# Patient Record
Sex: FEMALE | Race: WHITE | NOT HISPANIC OR LATINO | Employment: OTHER | URBAN - METROPOLITAN AREA
[De-identification: names, ages, dates, MRNs, and addresses within clinical notes are randomized per-mention and may not be internally consistent; named-entity substitution may affect disease eponyms.]

---

## 2017-07-11 ENCOUNTER — OFFICE VISIT (OUTPATIENT)
Dept: INTERNAL MEDICINE | Facility: CLINIC | Age: 57
End: 2017-07-11
Payer: COMMERCIAL

## 2017-07-11 VITALS
SYSTOLIC BLOOD PRESSURE: 124 MMHG | HEART RATE: 80 BPM | HEIGHT: 69 IN | BODY MASS INDEX: 27.7 KG/M2 | TEMPERATURE: 98 F | WEIGHT: 187 LBS | DIASTOLIC BLOOD PRESSURE: 72 MMHG

## 2017-07-11 DIAGNOSIS — S76.311A HAMSTRING MUSCLE STRAIN, RIGHT, INITIAL ENCOUNTER: Primary | ICD-10-CM

## 2017-07-11 PROCEDURE — 99213 OFFICE O/P EST LOW 20 MIN: CPT | Mod: S$GLB,,, | Performed by: PHYSICIAN ASSISTANT

## 2017-07-11 PROCEDURE — 99999 PR PBB SHADOW E&M-EST. PATIENT-LVL III: CPT | Mod: PBBFAC,,, | Performed by: PHYSICIAN ASSISTANT

## 2017-07-13 NOTE — PROGRESS NOTES
Subjective:       Patient ID: Lynn Turner is a 56 y.o. female.    Chief Complaint: Knee Pain    Knee Pain    Incident onset: 3 weeks ago  Incident location: dancing at wedding  The injury mechanism was a twisting injury. Pain location: hamstring, right side  The quality of the pain is described as aching. The pain is at a severity of 4/10. Pertinent negatives include no inability to bear weight, loss of motion, loss of sensation, muscle weakness, numbness or tingling. She reports no foreign bodies present. Exacerbated by: feet hanging down  She has tried elevation and heat for the symptoms.       Past Medical History:   Diagnosis Date    Aortic insufficiency     Bicuspid aortic valve     Hypertension     diet controlled    Palpitations     SVT (supraventricular tachycardia)     Tobacco use        Current Outpatient Prescriptions   Medication Sig Dispense Refill    aspirin 325 MG tablet Take 325 mg by mouth once daily.      calcium carbonate (OS-IVANA) 600 mg (1,500 mg) Tab Take 600 mg by mouth 2 (two) times daily with meals.      clotrimazole (LOTRIMIN) 1 % Soln Apply topically 2 (two) times daily. 1 drop in ear 3 times a day 5 mL 0    neomycin-polymyxin-dexamethasone (MAXITROL) 3.5mg/mL-10,000 unit/mL-0.1 % DrpS Apply to left ear 3 time a day 5 mL 0    VIT C/E/ZN/COPPR/LUTEIN/ZEAXAN (PRESERVISION AREDS 2 ORAL) Take by mouth.      SUPREP BOWEL PREP KIT 17.5-3.13-1.6 gram SolR Use as directed 354 mL 0     No current facility-administered medications for this visit.        Review of Systems   Constitutional: Negative.    HENT: Negative.    Eyes: Negative.    Respiratory: Negative.    Gastrointestinal: Negative.    Endocrine: Negative.    Genitourinary: Negative.    Musculoskeletal: Negative.         Leg pain, posterior    Skin: Negative.    Allergic/Immunologic: Negative.    Neurological: Negative.  Negative for tingling and numbness.   Hematological: Negative.    Psychiatric/Behavioral: Negative.       "  Objective:   /72   Pulse 80   Temp 98 °F (36.7 °C) (Tympanic)   Ht 5' 9" (1.753 m)   Wt 84.8 kg (187 lb)   BMI 27.62 kg/m²      Physical Exam   Constitutional: She appears well-developed and well-nourished. No distress.   HENT:   Head: Normocephalic and atraumatic.   Right Ear: External ear normal.   Left Ear: External ear normal.   Musculoskeletal:        Right knee: Normal.        Legs:        Lab Results   Component Value Date    WBC 11.44 11/30/2015    HGB 14.2 11/30/2015    HCT 44.1 11/30/2015     11/30/2015    CHOL 225 (H) 11/30/2015    TRIG 215 (H) 11/30/2015    HDL 34 (L) 11/30/2015    ALT 13 11/30/2015    AST 13 11/30/2015     11/30/2015    K 5.1 11/30/2015     11/30/2015    CREATININE 0.8 11/30/2015    BUN 15 11/30/2015    CO2 28 11/30/2015    TSH 1.95 09/09/2009       Assessment:       1. Hamstring muscle strain, right, initial encounter        Plan:   Hamstring muscle strain, right, initial encounter  -     Ambulatory Referral to Physical/Occupational Therapy    no signs of internal derangement of knee   Patient unable to take most nsaids   So advised elevation, stretching and P.T.   "

## 2017-08-12 ENCOUNTER — OFFICE VISIT (OUTPATIENT)
Dept: URGENT CARE | Facility: CLINIC | Age: 57
End: 2017-08-12
Payer: COMMERCIAL

## 2017-08-12 VITALS
HEIGHT: 69 IN | TEMPERATURE: 98 F | OXYGEN SATURATION: 98 % | SYSTOLIC BLOOD PRESSURE: 143 MMHG | BODY MASS INDEX: 27.66 KG/M2 | HEART RATE: 65 BPM | WEIGHT: 186.75 LBS | DIASTOLIC BLOOD PRESSURE: 63 MMHG

## 2017-08-12 DIAGNOSIS — M62.830 BACK MUSCLE SPASM: Primary | ICD-10-CM

## 2017-08-12 PROCEDURE — 3077F SYST BP >= 140 MM HG: CPT | Mod: S$GLB,,, | Performed by: NURSE PRACTITIONER

## 2017-08-12 PROCEDURE — 99999 PR PBB SHADOW E&M-EST. PATIENT-LVL IV: CPT | Mod: PBBFAC,,, | Performed by: NURSE PRACTITIONER

## 2017-08-12 PROCEDURE — 3008F BODY MASS INDEX DOCD: CPT | Mod: S$GLB,,, | Performed by: NURSE PRACTITIONER

## 2017-08-12 PROCEDURE — 99214 OFFICE O/P EST MOD 30 MIN: CPT | Mod: S$GLB,,, | Performed by: NURSE PRACTITIONER

## 2017-08-12 PROCEDURE — 3078F DIAST BP <80 MM HG: CPT | Mod: S$GLB,,, | Performed by: NURSE PRACTITIONER

## 2017-08-12 RX ORDER — ORPHENADRINE CITRATE 30 MG/ML
60 INJECTION INTRAMUSCULAR; INTRAVENOUS
Status: COMPLETED | OUTPATIENT
Start: 2017-08-12 | End: 2017-08-12

## 2017-08-12 RX ORDER — METOPROLOL SUCCINATE 25 MG/1
TABLET, EXTENDED RELEASE ORAL
Refills: 12 | COMMUNITY
Start: 2017-08-02 | End: 2019-09-19 | Stop reason: SDUPTHER

## 2017-08-12 RX ORDER — CYCLOBENZAPRINE HCL 5 MG
5 TABLET ORAL 3 TIMES DAILY PRN
Qty: 30 TABLET | Refills: 1 | Status: SHIPPED | OUTPATIENT
Start: 2017-08-12 | End: 2017-08-19

## 2017-08-12 RX ADMIN — ORPHENADRINE CITRATE 60 MG: 30 INJECTION INTRAMUSCULAR; INTRAVENOUS at 11:08

## 2017-08-12 NOTE — PROGRESS NOTES
"Subjective:       Patient ID: Lynn Turner is a 56 y.o. female.    Chief Complaint: Back Pain    HPI  Lynn presents with complaints of back spasms in the thoracic region. Monday she was lifting some boxes at work. The spasms did not start until Thursday but she does not remember any other strain or injury. She rates the pain at 8/10. She has tears in her eyes from the pain.  BP (!) 143/63 (BP Location: Right arm, Patient Position: Sitting, BP Method: Medium (Automatic))   Pulse 65   Temp 98.4 °F (36.9 °C) (Tympanic)   Ht 5' 9" (1.753 m)   Wt 84.7 kg (186 lb 11.7 oz)   SpO2 98%   BMI 27.58 kg/m²     Review of Systems   Constitutional: Negative for chills, diaphoresis and fever.   HENT: Negative for sore throat.    Respiratory: Negative for cough, chest tightness and shortness of breath.    Cardiovascular: Negative for chest pain.   Gastrointestinal: Negative for abdominal distention, abdominal pain, diarrhea, nausea and vomiting.   Genitourinary: Negative for difficulty urinating.   Musculoskeletal: Positive for back pain. Negative for myalgias, neck pain and neck stiffness.   Skin: Negative for rash and wound.   Allergic/Immunologic: Negative for immunocompromised state.   Neurological: Negative for headaches.   Hematological: Does not bruise/bleed easily.   Psychiatric/Behavioral: Negative for behavioral problems and confusion.       Objective:      Physical Exam   Constitutional: She is oriented to person, place, and time. She appears well-developed and well-nourished. No distress.   Eyes: Conjunctivae and EOM are normal. Pupils are equal, round, and reactive to light.   Neck: Neck supple.   Cardiovascular: Normal rate, regular rhythm and intact distal pulses.    No murmur heard.  Pulmonary/Chest: Breath sounds normal. No respiratory distress. She has no wheezes.   Abdominal: Soft. Bowel sounds are normal. There is no tenderness.   Musculoskeletal: Normal range of motion. She exhibits tenderness. She " exhibits no deformity.        Thoracic back: She exhibits tenderness, pain and spasm. She exhibits no bony tenderness, no swelling and no edema.   Paraspinal tenderness in the thoracic region   Lymphadenopathy:     She has no cervical adenopathy.   Neurological: She is alert and oriented to person, place, and time.   Skin: Skin is warm and dry. No rash noted. She is not diaphoretic.   Psychiatric: She has a normal mood and affect. Her behavior is normal.   Vitals reviewed.      Assessment:       1. Back muscle spasm        Plan:       Lynn was seen today for back pain.    Diagnoses and all orders for this visit:    Back muscle spasm  -     orphenadrine injection 60 mg; Inject 2 mLs (60 mg total) into the muscle one time.  -     cyclobenzaprine (FLEXERIL) 5 MG tablet; Take 1 tablet (5 mg total) by mouth 3 (three) times daily as needed for Muscle spasms.    She has been taking Aleve and states she does not react to this. Continue.  Ice and heat alternate. If not improving will send to physiatry.    Her BP was elevated initially, likely related to pain. It did come down after Norflex. Advised to monitor at home and follow up with PCP

## 2017-08-12 NOTE — PATIENT INSTRUCTIONS
Back Spasm (No Trauma)    Spasm of the back muscles can occur after a sudden forceful twisting or bending force (such as in a car accident), after a simple awkward movement, or after lifting something heavy with poor body positioning. In any case, muscle spasm adds to the pain. Sleeping in an awkward position or on a poor quality mattress can also cause this. Some people respond to emotional stress by tensing the muscles of their back.  Pain that continues may need further evaluation or other types of treatment such as physical therapy.  You don't always need X-rays for the initial evaluation of back pain, unless you had a physical injury such as from a car accident or fall. If your pain continues and doesn't respond to medical treatment, X-rays and other tests may then be done.   Home care  · As soon as possible, start sitting or walking again to avoid problems from prolonged bed rest (muscle weakness, worsening back stiffness and pain, blood clots in the legs).  · When in bed, try to find a position of comfort. A firm mattress is best. Try lying flat on your back with pillows under your knees. You can also try lying on your side with your knees bent up toward your chest and a pillow between your knees.  · Avoid prolonged sitting, long car rides, or travel. This puts more stress on the lower back than standing or walking.   · During the first 24 to 72 hours after an injury or flare-up, apply an ice pack to the painful area for 20 minutes, then remove it for 20 minutes. Do this over a period of 60 to 90 minutes or several times a day. This will reduce swelling and pain. Always wrap ice packs in a thin towel.  · You can start with ice, then switch to heat. Heat (hot shower, hot bath, or heating pad) reduces pain, and works well for muscle spasms. Apply heat to the painful area for 20 minutes, then remove it for 20 minutes. Do this over a period of 60 to 90 minutes or several times a day. Do not sleep on a heating  pad as it can burn or damage skin.  · Alternate ice and heat therapies.  · Be aware of safe lifting methods and do not lift anything over 15 pounds until all the pain is gone.  Gentle stretching will help your back heal faster. Do this simple routine 2 to 3 times a day until your back is feeling better.  · Lie on your back with your knees bent and both feet on the ground  · Slowly raise your left knee to your chest as you flatten your lower back against the floor. Hold for 20 to 30 seconds.  · Relax and repeat the exercise with your right knee.  · Do 2 to 3 of these exercises for each leg.  · Repeat, hugging both knees to your chest at the same time.  · Do not bounce, but use a gentle pull.  Medicines  Talk to your doctor before using medicine, especially if you have other medical problems or are taking other medicines.  You may use acetaminophen or ibuprofen to control pain, unless your healthcare provider prescribed another pain medicine. If you have a chronic condition such as diabetes, liver or kidney disease, stomach ulcer, or gastrointestinal bleeding, or are taking blood thinners, talk with your healthcare provider before taking any medicines.  Be careful if you are given prescription pain medicine, narcotics, or medicine for muscle spasm. They can cause drowsiness, affect your coordination, reflexes, or judgment. Do not drive or operate heavy machinery when taking these medicines. Take pain medicine only as prescribed by your healthcare provider.  Follow-up care  Follow up with your doctor, or as advised. Physical therapy or further tests may be needed.  If X-rays were taken, they may be reviewed by a radiologist. You will be notified of any new findings that may affect your care.  Call 911  Seek emergency medical care if any of these occur:  · Trouble breathing  · Confusion  · Drowsiness or trouble awakening  · Fainting or loss of consciousness  · Rapid or very slow heart rate  · Loss of bowel or bladder  control  When to seek medical advice  Call your healthcare provider right away if any of these occur:  · Pain becomes worse or spreads to your legs  · Weakness or numbness in one or both legs  · Numbness in the groin or genital area  · Unexplained fever over 100.4ºF (38.0ºC)  · Burning or pain when passing urine  Date Last Reviewed: 6/1/2016  © 8354-5697 [employer]. [address] [place] [address], [place] [id number]. All rights reserved. This information is not intended as a substitute for professional medical care. Always follow your healthcare professional's instructions.

## 2017-08-12 NOTE — PROGRESS NOTES
Administered norflex 60 mg IM injection to pt's left ventrogluteal. Patient tolerated well. No distress. Patient was instructed to wait 20 minutes in exam room after injection to assure that no reaction to the medication occurs. Patient was informed that if any unusual feeling occurs to let the staff know so that we can address it. Patient stated understanding.

## 2017-08-28 ENCOUNTER — PATIENT MESSAGE (OUTPATIENT)
Dept: INTERNAL MEDICINE | Facility: CLINIC | Age: 57
End: 2017-08-28

## 2017-08-28 DIAGNOSIS — Z00.00 ROUTINE GENERAL MEDICAL EXAMINATION AT HEALTH CARE FACILITY: Primary | ICD-10-CM

## 2017-08-30 ENCOUNTER — LAB VISIT (OUTPATIENT)
Dept: LAB | Facility: HOSPITAL | Age: 57
End: 2017-08-30
Attending: INTERNAL MEDICINE
Payer: COMMERCIAL

## 2017-08-30 DIAGNOSIS — Z00.00 ROUTINE GENERAL MEDICAL EXAMINATION AT HEALTH CARE FACILITY: ICD-10-CM

## 2017-08-30 LAB
ALBUMIN SERPL BCP-MCNC: 3.5 G/DL
ALP SERPL-CCNC: 117 U/L
ALT SERPL W/O P-5'-P-CCNC: 20 U/L
ANION GAP SERPL CALC-SCNC: 11 MMOL/L
AST SERPL-CCNC: 18 U/L
BASOPHILS # BLD AUTO: 0.07 K/UL
BASOPHILS NFR BLD: 0.8 %
BILIRUB SERPL-MCNC: 0.4 MG/DL
BUN SERPL-MCNC: 16 MG/DL
CALCIUM SERPL-MCNC: 9.7 MG/DL
CHLORIDE SERPL-SCNC: 106 MMOL/L
CHOLEST SERPL-MCNC: 196 MG/DL
CHOLEST/HDLC SERPL: 5.3 {RATIO}
CO2 SERPL-SCNC: 25 MMOL/L
CREAT SERPL-MCNC: 0.7 MG/DL
DIFFERENTIAL METHOD: NORMAL
EOSINOPHIL # BLD AUTO: 0.3 K/UL
EOSINOPHIL NFR BLD: 3.5 %
ERYTHROCYTE [DISTWIDTH] IN BLOOD BY AUTOMATED COUNT: 14.2 %
EST. GFR  (AFRICAN AMERICAN): >60 ML/MIN/1.73 M^2
EST. GFR  (NON AFRICAN AMERICAN): >60 ML/MIN/1.73 M^2
GLUCOSE SERPL-MCNC: 54 MG/DL
HCT VFR BLD AUTO: 40.9 %
HDLC SERPL-MCNC: 37 MG/DL
HDLC SERPL: 18.9 %
HGB BLD-MCNC: 13.5 G/DL
LDLC SERPL CALC-MCNC: 126 MG/DL
LYMPHOCYTES # BLD AUTO: 2.6 K/UL
LYMPHOCYTES NFR BLD: 29 %
MCH RBC QN AUTO: 30.1 PG
MCHC RBC AUTO-ENTMCNC: 33 G/DL
MCV RBC AUTO: 91 FL
MONOCYTES # BLD AUTO: 0.6 K/UL
MONOCYTES NFR BLD: 6.8 %
NEUTROPHILS # BLD AUTO: 5.4 K/UL
NEUTROPHILS NFR BLD: 59.7 %
NONHDLC SERPL-MCNC: 159 MG/DL
PLATELET # BLD AUTO: 289 K/UL
PMV BLD AUTO: 10.7 FL
POTASSIUM SERPL-SCNC: 5.2 MMOL/L
PROT SERPL-MCNC: 7.1 G/DL
RBC # BLD AUTO: 4.49 M/UL
SODIUM SERPL-SCNC: 142 MMOL/L
TRIGL SERPL-MCNC: 165 MG/DL
TSH SERPL DL<=0.005 MIU/L-ACNC: 2.75 UIU/ML
WBC # BLD AUTO: 9.08 K/UL

## 2017-08-30 PROCEDURE — 80061 LIPID PANEL: CPT

## 2017-08-30 PROCEDURE — 80053 COMPREHEN METABOLIC PANEL: CPT

## 2017-08-30 PROCEDURE — 36415 COLL VENOUS BLD VENIPUNCTURE: CPT | Mod: PO

## 2017-08-30 PROCEDURE — 84443 ASSAY THYROID STIM HORMONE: CPT

## 2017-08-30 PROCEDURE — 85025 COMPLETE CBC W/AUTO DIFF WBC: CPT

## 2017-08-31 NOTE — PROGRESS NOTES
Here are the results of your recent labs.  We will review them in detail at your follow up visit.    Sincerely,    Atilio Howard M.D.        If you would like to review your experience with Dr. Howard or [facility], please follow the link below:    [email or web address]

## 2017-09-06 ENCOUNTER — OFFICE VISIT (OUTPATIENT)
Dept: INTERNAL MEDICINE | Facility: CLINIC | Age: 57
End: 2017-09-06
Payer: COMMERCIAL

## 2017-09-06 VITALS
HEIGHT: 69 IN | WEIGHT: 192.69 LBS | SYSTOLIC BLOOD PRESSURE: 160 MMHG | DIASTOLIC BLOOD PRESSURE: 80 MMHG | HEART RATE: 64 BPM | TEMPERATURE: 98 F | BODY MASS INDEX: 28.54 KG/M2

## 2017-09-06 DIAGNOSIS — L40.9 PSORIASIS: ICD-10-CM

## 2017-09-06 DIAGNOSIS — I10 ESSENTIAL HYPERTENSION: ICD-10-CM

## 2017-09-06 DIAGNOSIS — Z00.00 ROUTINE GENERAL MEDICAL EXAMINATION AT HEALTH CARE FACILITY: Primary | ICD-10-CM

## 2017-09-06 DIAGNOSIS — I47.10 PAROXYSMAL SVT (SUPRAVENTRICULAR TACHYCARDIA): ICD-10-CM

## 2017-09-06 PROCEDURE — 99999 PR PBB SHADOW E&M-EST. PATIENT-LVL IV: CPT | Mod: PBBFAC,,, | Performed by: INTERNAL MEDICINE

## 2017-09-06 PROCEDURE — 90471 IMMUNIZATION ADMIN: CPT | Mod: S$GLB,,, | Performed by: INTERNAL MEDICINE

## 2017-09-06 PROCEDURE — 99396 PREV VISIT EST AGE 40-64: CPT | Mod: 25,S$GLB,, | Performed by: INTERNAL MEDICINE

## 2017-09-06 PROCEDURE — 90715 TDAP VACCINE 7 YRS/> IM: CPT | Mod: S$GLB,,, | Performed by: INTERNAL MEDICINE

## 2017-09-06 RX ORDER — LOSARTAN POTASSIUM 50 MG/1
50 TABLET ORAL DAILY
Qty: 90 TABLET | Refills: 3 | Status: SHIPPED | OUTPATIENT
Start: 2017-09-06 | End: 2018-09-06 | Stop reason: SDUPTHER

## 2017-09-06 RX ORDER — VARENICLINE TARTRATE 0.5 (11)-1
KIT ORAL
Refills: 0 | COMMUNITY
Start: 2017-08-09 | End: 2018-03-06

## 2017-09-06 NOTE — ASSESSMENT & PLAN NOTE
Following with Dr. Merino, started on metoprolol for rate control.  No new EP study recommended at this time.

## 2017-09-06 NOTE — ASSESSMENT & PLAN NOTE
Start losartan 50 mg once daily for blood pressure.  Did not increase metoprolol due to  Bradycardia.

## 2017-09-06 NOTE — PROGRESS NOTES
"Subjective:       Patient ID: Lynn Turner is a 56 y.o. female.    Chief Complaint: Annual Exam    HPI  Patient is a 56-year-old female presented for updated physical exam.  She is patient with history of hypertension, paroxysmal SVT, bicuspid aortic valve, psoriasis.  She relates that generally she has been doing very well.  She continues follow with her cardiologist.  They recently put her on some metoprolol because she was having some mild palpitations.  She has noted that even with the metoprolol she's been seeing a little bit of an increase in her blood pressure.  She notes that the metoprolol is seeming to have the desired effect on her cardiac issues as her pulse rate is typically in the low 60s or upper 50s.  She has occasional lightheadedness when getting out of chair.    Her psoriasis has been stable over time.  She uses topical moisturizers when she needs them.  She has noticed a little bit of pitting of her nails.  She has not seen a dermatologist in some time as her dermatologist has retired.    Review of Systems   Constitutional: Negative for chills and fever.   HENT: Negative for hearing loss.    Eyes: Negative for photophobia and visual disturbance.   Respiratory: Negative for cough, shortness of breath and wheezing.    Cardiovascular: Negative for chest pain and palpitations.   Gastrointestinal: Negative for blood in stool, constipation, nausea and vomiting.   Genitourinary: Negative for dysuria and hematuria.   Musculoskeletal: Negative for neck pain and neck stiffness.   Skin: Negative for rash.   Neurological: Negative for syncope, weakness, light-headedness and headaches.   Hematological: Negative for adenopathy.   Psychiatric/Behavioral: Negative for dysphoric mood. The patient is not nervous/anxious.        Objective:   BP (!) 160/80   Pulse 64   Temp 98.3 °F (36.8 °C) (Tympanic)   Ht 5' 9" (1.753 m)   Wt 87.4 kg (192 lb 10.9 oz)   BMI 28.45 kg/m²      Physical Exam   Constitutional: She " is oriented to person, place, and time. She appears well-developed and well-nourished.   HENT:   Head: Normocephalic and atraumatic.   Eyes: EOM are normal. Pupils are equal, round, and reactive to light.   Neck: Normal range of motion. Neck supple. No thyromegaly present.   Cardiovascular: Normal rate, regular rhythm and intact distal pulses.  Exam reveals no gallop and no friction rub.    No murmur heard.  Pulmonary/Chest: Breath sounds normal. She has no wheezes. She has no rales. She exhibits no tenderness.   Abdominal: Soft. Bowel sounds are normal. She exhibits no distension and no mass. There is no tenderness. There is no rebound and no guarding.   Musculoskeletal: She exhibits no edema.   Lymphadenopathy:     She has no cervical adenopathy.   Neurological: She is alert and oriented to person, place, and time. She has normal reflexes. She displays normal reflexes. No cranial nerve deficit.   Skin: Skin is warm and dry. No rash noted.   Psychiatric: She has a normal mood and affect. Her behavior is normal. Judgment normal.   Vitals reviewed.      Lab Visit on 08/30/2017   Component Date Value    WBC 08/30/2017 9.08     RBC 08/30/2017 4.49     Hemoglobin 08/30/2017 13.5     Hematocrit 08/30/2017 40.9     MCV 08/30/2017 91     MCH 08/30/2017 30.1     MCHC 08/30/2017 33.0     RDW 08/30/2017 14.2     Platelets 08/30/2017 289     MPV 08/30/2017 10.7     Gran # 08/30/2017 5.4     Lymph # 08/30/2017 2.6     Mono # 08/30/2017 0.6     Eos # 08/30/2017 0.3     Baso # 08/30/2017 0.07     Gran% 08/30/2017 59.7     Lymph% 08/30/2017 29.0     Mono% 08/30/2017 6.8     Eosinophil% 08/30/2017 3.5     Basophil% 08/30/2017 0.8     Differential Method 08/30/2017 Automated     Sodium 08/30/2017 142     Potassium 08/30/2017 5.2*    Chloride 08/30/2017 106     CO2 08/30/2017 25     Glucose 08/30/2017 54*    BUN, Bld 08/30/2017 16     Creatinine 08/30/2017 0.7     Calcium 08/30/2017 9.7     Total Protein  08/30/2017 7.1     Albumin 08/30/2017 3.5     Total Bilirubin 08/30/2017 0.4     Alkaline Phosphatase 08/30/2017 117     AST 08/30/2017 18     ALT 08/30/2017 20     Anion Gap 08/30/2017 11     eGFR if African American 08/30/2017 >60.0     eGFR if non  Amer* 08/30/2017 >60.0     Cholesterol 08/30/2017 196     Triglycerides 08/30/2017 165*    HDL 08/30/2017 37*    LDL Cholesterol 08/30/2017 126.0     HDL/Chol Ratio 08/30/2017 18.9*    Total Cholesterol/HDL Ra* 08/30/2017 5.3*    Non-HDL Cholesterol 08/30/2017 159     TSH 08/30/2017 2.745        Assessment:       1. Routine general medical examination at health care facility    2. Essential hypertension    3. Paroxysmal SVT (supraventricular tachycardia)    4. Psoriasis        Plan:   Paroxysmal SVT (supraventricular tachycardia)  Following with Dr. Merino, started on metoprolol for rate control.  No new EP study recommended at this time.    Essential hypertension  Start losartan 50 mg once daily for blood pressure.  Did not increase metoprolol due to  Bradycardia.    Lynn was seen today for annual exam.    Diagnoses and all orders for this visit:    Routine general medical examination at health care facility  -     (In Office Administered) Tdap Vaccine    Essential hypertension  Comments:  start losartan 50 mg once daily, follow up in one month  Orders:  -     losartan (COZAAR) 50 MG tablet; Take 1 tablet (50 mg total) by mouth once daily.    Paroxysmal SVT (supraventricular tachycardia)  Comments:  Stable on metoprolol.  Some bradycardia.  Will continue current dose.    Psoriasis  -     Ambulatory referral to Dermatology        Return in about 4 weeks (around 10/4/2017) for htn.

## 2017-10-09 ENCOUNTER — OFFICE VISIT (OUTPATIENT)
Dept: INTERNAL MEDICINE | Facility: CLINIC | Age: 57
End: 2017-10-09
Payer: COMMERCIAL

## 2017-10-09 ENCOUNTER — LAB VISIT (OUTPATIENT)
Dept: LAB | Facility: HOSPITAL | Age: 57
End: 2017-10-09
Attending: PHYSICIAN ASSISTANT
Payer: COMMERCIAL

## 2017-10-09 VITALS
BODY MASS INDEX: 29.03 KG/M2 | TEMPERATURE: 99 F | SYSTOLIC BLOOD PRESSURE: 124 MMHG | HEART RATE: 72 BPM | WEIGHT: 196 LBS | HEIGHT: 69 IN | DIASTOLIC BLOOD PRESSURE: 80 MMHG

## 2017-10-09 DIAGNOSIS — I10 ESSENTIAL HYPERTENSION: ICD-10-CM

## 2017-10-09 DIAGNOSIS — R07.9 INTERMITTENT CHEST PAIN: ICD-10-CM

## 2017-10-09 DIAGNOSIS — R41.3 MEMORY DIFFICULTY: Primary | ICD-10-CM

## 2017-10-09 DIAGNOSIS — R09.89 RIGHT CAROTID BRUIT: ICD-10-CM

## 2017-10-09 DIAGNOSIS — R41.3 MEMORY DIFFICULTY: ICD-10-CM

## 2017-10-09 LAB
ALBUMIN SERPL BCP-MCNC: 3.8 G/DL
ALP SERPL-CCNC: 108 U/L
ALT SERPL W/O P-5'-P-CCNC: 16 U/L
ANION GAP SERPL CALC-SCNC: 8 MMOL/L
AST SERPL-CCNC: 14 U/L
BASOPHILS # BLD AUTO: 0.07 K/UL
BASOPHILS NFR BLD: 0.8 %
BILIRUB SERPL-MCNC: 0.4 MG/DL
BUN SERPL-MCNC: 17 MG/DL
CALCIUM SERPL-MCNC: 9.4 MG/DL
CHLORIDE SERPL-SCNC: 105 MMOL/L
CO2 SERPL-SCNC: 26 MMOL/L
CREAT SERPL-MCNC: 0.8 MG/DL
DIFFERENTIAL METHOD: NORMAL
EOSINOPHIL # BLD AUTO: 0.3 K/UL
EOSINOPHIL NFR BLD: 3.8 %
ERYTHROCYTE [DISTWIDTH] IN BLOOD BY AUTOMATED COUNT: 14.3 %
EST. GFR  (AFRICAN AMERICAN): >60 ML/MIN/1.73 M^2
EST. GFR  (NON AFRICAN AMERICAN): >60 ML/MIN/1.73 M^2
FOLATE SERPL-MCNC: 12.4 NG/ML
GLUCOSE SERPL-MCNC: 70 MG/DL
HCT VFR BLD AUTO: 40.4 %
HGB BLD-MCNC: 13.1 G/DL
LYMPHOCYTES # BLD AUTO: 2.2 K/UL
LYMPHOCYTES NFR BLD: 25.6 %
MCH RBC QN AUTO: 29.8 PG
MCHC RBC AUTO-ENTMCNC: 32.4 G/DL
MCV RBC AUTO: 92 FL
MONOCYTES # BLD AUTO: 0.5 K/UL
MONOCYTES NFR BLD: 5.9 %
NEUTROPHILS # BLD AUTO: 5.5 K/UL
NEUTROPHILS NFR BLD: 63.7 %
PLATELET # BLD AUTO: 291 K/UL
PMV BLD AUTO: 10.4 FL
POTASSIUM SERPL-SCNC: 4.4 MMOL/L
PROT SERPL-MCNC: 7.7 G/DL
RBC # BLD AUTO: 4.4 M/UL
SODIUM SERPL-SCNC: 139 MMOL/L
VIT B12 SERPL-MCNC: 483 PG/ML
WBC # BLD AUTO: 8.66 K/UL

## 2017-10-09 PROCEDURE — 99214 OFFICE O/P EST MOD 30 MIN: CPT | Mod: S$GLB,,, | Performed by: PHYSICIAN ASSISTANT

## 2017-10-09 PROCEDURE — 82607 VITAMIN B-12: CPT

## 2017-10-09 PROCEDURE — 84443 ASSAY THYROID STIM HORMONE: CPT

## 2017-10-09 PROCEDURE — 82746 ASSAY OF FOLIC ACID SERUM: CPT

## 2017-10-09 PROCEDURE — 84439 ASSAY OF FREE THYROXINE: CPT

## 2017-10-09 PROCEDURE — 85025 COMPLETE CBC W/AUTO DIFF WBC: CPT

## 2017-10-09 PROCEDURE — 80053 COMPREHEN METABOLIC PANEL: CPT

## 2017-10-09 PROCEDURE — 36415 COLL VENOUS BLD VENIPUNCTURE: CPT | Mod: PO

## 2017-10-09 PROCEDURE — 99999 PR PBB SHADOW E&M-EST. PATIENT-LVL IV: CPT | Mod: PBBFAC,,, | Performed by: PHYSICIAN ASSISTANT

## 2017-10-09 PROCEDURE — 86592 SYPHILIS TEST NON-TREP QUAL: CPT

## 2017-10-09 RX ORDER — CYCLOBENZAPRINE HCL 5 MG
TABLET ORAL
Refills: 1 | COMMUNITY
Start: 2017-08-23 | End: 2018-03-06

## 2017-10-09 NOTE — PROGRESS NOTES
Subjective:       Patient ID: Lynn Turner is a 57 y.o. female.    Chief Complaint: Follow-up    HPI   Patient comes in today for follow up hypertension   She was recetnly started on losartan and is here today to follow up on that. She also did not mention in her last visit a few other health concerns so she would like to discuss them today.  First, she has been having memory issues: denies head injury  This has been going on for several months. She states that about twice a month, she has a large lapse in her memory. For instance, she woke up one day and forgot to go into the office. Secondly she did not remember shopping for shoes but found the shoes in her closet about month later.  Recently, she came out of work and saw all of her windows rolled down and cannot rememeber rolling them down.   This is starting to concern her.     Secondly ,she has some intermittent chest discomfort that last seconds and goes away. Not related to exertion. She does see outside cardiology and states she recently had a check up, including ECHO and that everything was ok     She does have history of SVT, Ao Insufficiently, Bicuspid AV      Past Medical History:   Diagnosis Date    Aortic insufficiency     Bicuspid aortic valve     Hypertension     diet controlled    Palpitations     SVT (supraventricular tachycardia)     Tobacco use        Current Outpatient Prescriptions   Medication Sig Dispense Refill    aspirin 325 MG tablet Take 325 mg by mouth once daily.      calcium carbonate (OS-IVANA) 600 mg (1,500 mg) Tab Take 600 mg by mouth 2 (two) times daily with meals.      CHANTIX STARTING MONTH BOX 0.5 mg (11)- 1 mg (42) tablet TK UTD ON PACKAGE  0    clotrimazole (LOTRIMIN) 1 % Soln Apply topically 2 (two) times daily. 1 drop in ear 3 times a day 5 mL 0    losartan (COZAAR) 50 MG tablet Take 1 tablet (50 mg total) by mouth once daily. 90 tablet 3    metoprolol succinate (TOPROL-XL) 25 MG 24 hr tablet TK 1 T PO QD  12     "VIT C/E/ZN/COPPR/LUTEIN/ZEAXAN (PRESERVISION AREDS 2 ORAL) Take by mouth.      cyclobenzaprine (FLEXERIL) 5 MG tablet   1    neomycin-polymyxin-dexamethasone (MAXITROL) 3.5mg/mL-10,000 unit/mL-0.1 % DrpS Apply to left ear 3 time a day 5 mL 0     No current facility-administered medications for this visit.        Review of Systems   Constitutional: Positive for activity change.   HENT: Negative.    Eyes: Negative.    Respiratory: Negative.    Cardiovascular: Positive for chest pain. Negative for palpitations and leg swelling.   Gastrointestinal: Negative.    Endocrine: Negative.    Genitourinary: Negative.    Musculoskeletal: Negative.    Allergic/Immunologic: Negative.    Neurological: Positive for headaches (on/off, left side ). Negative for syncope, speech difficulty and weakness.        Memory issues    Hematological: Negative.    Psychiatric/Behavioral: Negative.        Objective:   /80   Pulse 72   Temp 98.8 °F (37.1 °C) (Tympanic)   Ht 5' 9" (1.753 m)   Wt 88.9 kg (196 lb)   BMI 28.94 kg/m²      Physical Exam   Constitutional: She is oriented to person, place, and time. She appears well-developed and well-nourished. No distress.   HENT:   Head: Normocephalic and atraumatic.   Right Ear: External ear normal.   Left Ear: External ear normal.   Nose: Nose normal.   Mouth/Throat: Oropharynx is clear and moist. No oropharyngeal exudate.   Eyes: Conjunctivae and EOM are normal. Pupils are equal, round, and reactive to light.   Neck: Normal range of motion. Neck supple.   Cardiovascular: Normal rate, regular rhythm and normal heart sounds.    Pulses:       Carotid pulses are 2+ on the right side with bruit, and 2+ on the left side.       Radial pulses are 2+ on the right side, and 2+ on the left side.   Pulmonary/Chest: Effort normal and breath sounds normal.   Abdominal: Soft. Bowel sounds are normal.   Musculoskeletal: Normal range of motion.   Neurological: She is alert and oriented to person, place, " and time.   Skin: Skin is warm. She is not diaphoretic.   Psychiatric: She has a normal mood and affect. Her behavior is normal. Judgment and thought content normal.         Lab Results   Component Value Date    WBC 9.08 08/30/2017    HGB 13.5 08/30/2017    HCT 40.9 08/30/2017     08/30/2017    CHOL 196 08/30/2017    TRIG 165 (H) 08/30/2017    HDL 37 (L) 08/30/2017    ALT 20 08/30/2017    AST 18 08/30/2017     08/30/2017    K 5.2 (H) 08/30/2017     08/30/2017    CREATININE 0.7 08/30/2017    BUN 16 08/30/2017    CO2 25 08/30/2017    TSH 2.745 08/30/2017       Assessment:       1. Memory difficulty    2. Right carotid bruit    3. Essential hypertension    4. Intermittent chest pain        Plan:   Memory difficulty- pts scenarios raise concern so will get labs, MRI   -     MRI Brain Without Contrast; Future; Expected date: 10/09/2017  -     TSH; Future; Expected date: 10/09/2017  -     Vitamin B12; Future; Expected date: 10/09/2017  -     Folate; Future; Expected date: 10/09/2017  -     RPR; Future; Expected date: 10/09/2017  -     Comprehensive metabolic panel; Future; Expected date: 10/09/2017  -     CBC auto differential; Future; Expected date: 10/09/2017  -     T4, free; Future; Expected date: 10/09/2017    Right carotid bruit  -     US Carotid Bilateral; Future; Expected date: 10/09/2017    Essential hypertension- better control with losartan    Intermittent chest pain x 2 months-Patient recently seen by cardio, suggest a follow up on this with cardio  Non exertional

## 2017-10-10 ENCOUNTER — TELEPHONE (OUTPATIENT)
Dept: RADIOLOGY | Facility: HOSPITAL | Age: 57
End: 2017-10-10

## 2017-10-10 LAB
RPR SER QL: NORMAL
T4 FREE SERPL-MCNC: 1.03 NG/DL
TSH SERPL DL<=0.005 MIU/L-ACNC: 2.33 UIU/ML

## 2017-10-11 ENCOUNTER — HOSPITAL ENCOUNTER (OUTPATIENT)
Dept: RADIOLOGY | Facility: HOSPITAL | Age: 57
Discharge: HOME | End: 2017-10-11
Attending: PHYSICIAN ASSISTANT
Payer: COMMERCIAL

## 2017-10-11 ENCOUNTER — PATIENT MESSAGE (OUTPATIENT)
Dept: INTERNAL MEDICINE | Facility: CLINIC | Age: 57
End: 2017-10-11

## 2017-10-11 DIAGNOSIS — R41.3 MEMORY CHANGES: Primary | ICD-10-CM

## 2017-10-11 DIAGNOSIS — R09.89 RIGHT CAROTID BRUIT: ICD-10-CM

## 2017-10-11 PROCEDURE — 93880 EXTRACRANIAL BILAT STUDY: CPT | Mod: TC,PO

## 2017-10-11 PROCEDURE — 93880 EXTRACRANIAL BILAT STUDY: CPT | Mod: 26,,, | Performed by: RADIOLOGY

## 2017-10-12 ENCOUNTER — PATIENT MESSAGE (OUTPATIENT)
Dept: INTERNAL MEDICINE | Facility: CLINIC | Age: 57
End: 2017-10-12

## 2017-10-13 RX ORDER — ROSUVASTATIN CALCIUM 5 MG/1
5 TABLET, COATED ORAL DAILY
Qty: 90 TABLET | Refills: 3 | Status: SHIPPED | OUTPATIENT
Start: 2017-10-13 | End: 2018-10-18 | Stop reason: SDUPTHER

## 2017-10-14 ENCOUNTER — HOSPITAL ENCOUNTER (OUTPATIENT)
Dept: RADIOLOGY | Facility: HOSPITAL | Age: 57
Discharge: HOME | End: 2017-10-14
Attending: PHYSICIAN ASSISTANT
Payer: COMMERCIAL

## 2017-10-14 DIAGNOSIS — R41.3 MEMORY CHANGES: ICD-10-CM

## 2017-10-14 PROCEDURE — 70553 MRI BRAIN STEM W/O & W/DYE: CPT | Mod: 26,,, | Performed by: RADIOLOGY

## 2017-10-14 PROCEDURE — 70553 MRI BRAIN STEM W/O & W/DYE: CPT | Mod: TC,PO

## 2017-10-14 PROCEDURE — A9585 GADOBUTROL INJECTION: HCPCS | Mod: PO | Performed by: PHYSICIAN ASSISTANT

## 2017-10-14 PROCEDURE — 25500020 PHARM REV CODE 255: Mod: PO | Performed by: PHYSICIAN ASSISTANT

## 2017-10-14 RX ORDER — GADOBUTROL 604.72 MG/ML
8 INJECTION INTRAVENOUS
Status: COMPLETED | OUTPATIENT
Start: 2017-10-14 | End: 2017-10-14

## 2017-10-14 RX ADMIN — GADOBUTROL 8 ML: 604.72 INJECTION INTRAVENOUS at 11:10

## 2017-10-16 ENCOUNTER — PATIENT MESSAGE (OUTPATIENT)
Dept: INTERNAL MEDICINE | Facility: CLINIC | Age: 57
End: 2017-10-16

## 2017-10-16 DIAGNOSIS — R41.3 MEMORY CHANGES: Primary | ICD-10-CM

## 2017-10-17 ENCOUNTER — PATIENT MESSAGE (OUTPATIENT)
Dept: INTERNAL MEDICINE | Facility: CLINIC | Age: 57
End: 2017-10-17

## 2017-10-18 ENCOUNTER — INITIAL CONSULT (OUTPATIENT)
Dept: DERMATOLOGY | Facility: CLINIC | Age: 57
End: 2017-10-18
Payer: COMMERCIAL

## 2017-10-18 DIAGNOSIS — L60.8 PITTING OF NAILS: ICD-10-CM

## 2017-10-18 DIAGNOSIS — L40.3 PALMOPLANTAR PUSTULAR PSORIASIS: Primary | ICD-10-CM

## 2017-10-18 DIAGNOSIS — L60.3 ONYCHODYSTROPHY: ICD-10-CM

## 2017-10-18 PROCEDURE — 99999 PR PBB SHADOW E&M-EST. PATIENT-LVL II: CPT | Mod: PBBFAC,,, | Performed by: DERMATOLOGY

## 2017-10-18 PROCEDURE — 87101 SKIN FUNGI CULTURE: CPT

## 2017-10-18 PROCEDURE — 99202 OFFICE O/P NEW SF 15 MIN: CPT | Mod: S$GLB,,, | Performed by: DERMATOLOGY

## 2017-10-18 RX ORDER — CLOBETASOL PROPIONATE 0.5 MG/G
AEROSOL, FOAM TOPICAL
Qty: 100 ML | Refills: 3 | Status: SHIPPED | OUTPATIENT
Start: 2017-10-18 | End: 2018-03-06

## 2017-10-18 NOTE — LETTER
October 18, 2017      Atilio Howard MD  [address] [facility]  [address]  [place] [address]           [facility] - Dermatology  [address]  [place] [address]  Phone: [phone]  Fax: [phone]          Patient: Lynn Turner   MR Number: [id number]   YOB: 1960   Date of Visit: 10/18/2017       Dear Dr. Atilio Howard:    Thank you for referring Lynn Turner to me for evaluation. Attached you will find relevant portions of my assessment and plan of care.    If you have questions, please do not hesitate to call me. I look forward to following Lynn Turner along with you.    Sincerely,    Janelle Griffith MD    Enclosure  CC:  No Recipients    If you would like to receive this communication electronically, please contact [email or web address] or [phone] to request more information on [employer] Link access.    For providers and/or their staff who would like to refer a patient to [facility], please contact us through our one-stop-shop provider referral line, [facility], at [phone].    If you feel you have received this communication in error or would no longer like to receive these types of communications, please e-mail [email or web address]

## 2017-10-18 NOTE — PROGRESS NOTES
Subjective:       Patient ID:  Lynn Turner is a 57 y.o. female who presents for   Chief Complaint   Patient presents with    Psoriasis     c/o hx of psoriasis to feet, hands and elbows,,currently flared,,tx with lotion    Nail Problem     c/o pits to nails x several months,,asymptomatic,,no tx     Hx of palmoplantar pustular psoriasis    History of Present Illness: The patient presents with chief complaint of psoriasis.  Location: palms, soles, elbows  Duration: 8-10 years  Signs/Symptoms: dryness    Prior treatments: OTC lotions, hand/foot phototherapy    She recently quit smoking 2 months ago (40 pack-year smoking hx prior) with use of chantix          Review of Systems   Constitutional: Negative for fever and chills.   Gastrointestinal: Negative for nausea and vomiting.   Skin: Positive for rash. Negative for itching, daily sunscreen use, activity-related sunscreen use and recent sunburn.   Hematologic/Lymphatic: Does not bruise/bleed easily.        Objective:    Physical Exam   Constitutional: She appears well-developed and well-nourished. No distress.   Neurological: She is alert and oriented to person, place, and time. She is not disoriented.   Psychiatric: She has a normal mood and affect.   Skin:   Areas Examined (abnormalities noted in diagram):   Head / Face Inspection Performed  Neck Inspection Performed  Chest / Axilla Inspection Performed  Abdomen Inspection Performed  Back Inspection Performed  RUE Inspected  LUE Inspection Performed  Nails and Digits Inspection Performed                 Diagram Legend     Erythematous scaling macule/papule c/w actinic keratosis       Vascular papule c/w angioma      Pigmented verrucoid papule/plaque c/w seborrheic keratosis      Yellow umbilicated papule c/w sebaceous hyperplasia      Irregularly shaped tan macule c/w lentigo     1-2 mm smooth white papules consistent with Milia      Movable subcutaneous cyst with punctum c/w epidermal inclusion cyst       Subcutaneous movable cyst c/w pilar cyst      Firm pink to brown papule c/w dermatofibroma      Pedunculated fleshy papule(s) c/w skin tag(s)      Evenly pigmented macule c/w junctional nevus     Mildly variegated pigmented, slightly irregular-bordered macule c/w mildly atypical nevus      Flesh colored to evenly pigmented papule c/w intradermal nevus       Pink pearly papule/plaque c/w basal cell carcinoma      Erythematous hyperkeratotic cursted plaque c/w SCC      Surgical scar with no sign of skin cancer recurrence      Open and closed comedones      Inflammatory papules and pustules      Verrucoid papule consistent consistent with wart     Erythematous eczematous patches and plaques     Dystrophic onycholytic nail with subungual debris c/w onychomycosis     Umbilicated papule    Erythematous-base heme-crusted tan verrucoid plaque consistent with inflamed seborrheic keratosis     Erythematous Silvery Scaling Plaque c/w Psoriasis     See annotation      Assessment / Plan:        Palmoplantar pustular psoriasis  -     clobetasol-emollient (OLUX-E) 0.05 % topical foam; AAA BID prn  Dispense: 100 mL; Refill: 3        -     Mild on feet.  No palm involvement today.  Will start above med        Onychodystrophy  Fungus vs. Psoriasis. Fungal culture done today. rtc in 8 weeks to review results    Pitting of nails  Reassurance provided.  Discussed relationship to psoriasis           Return in about 2 months (around 12/18/2017).

## 2017-10-18 NOTE — PATIENT INSTRUCTIONS
Psoriasis  Psoriasis is an inflammatory condition that affects the skin and nails. You may have patches of thick, red skin (plaques) covered with silvery scales. These often appear on the elbows, knees, legs, lower back, and scalp.  The plaques itch and can be painful. People with this condition are more likely to have emotional stress and depression.  Psoriasis is not contagious. It cant spread to someone else who touches it. But it can be inherited. It is an autoimmune skin disease. This means that the immune system has an abnormal reaction. It treats healthy skin like it is a foreign substance. This causes skin cells to grow faster than normal and to stack up in raised red patches. Psoriasis is a long-term (chronic) disease. You will have flare-ups that come and go over time.  Smoking, sun exposure, and alcohol use may affect how often the psoriasis occurs and how long the flare-ups last.  There is no cure, but treatments can offer relief. Treatment can include topical creams, light therapy (phototherapy), and oral or injectable medicines.  Home care  · No specific diet is needed. Eat a healthy, well-balanced diet that includes fresh fruits and vegetables, whole grains, and lean meats. Psoriasis can increase your risk for diabetes and heart disease.  · Increasing omega-3 fatty acids in your diet can help improve dry skin. The best dietary sources are fatty fish (salmon, mackerel, lake trout, albacore tuna) or fish oil (such as cod liver oil). A great way to take fish oil is to add it to a juice, shake, or smoothie. Flaxseeds and flaxseed oil, canola oil, walnuts, soybean, and tofu are converted to omega-3 fatty acid in the body.  · Stay at a healthy weight. Overlapping skin folds can be a site for psoriasis plaques. If you are overweight, talk to your healthcare provider about a weight-loss program.  · Bathing daily can help remove scales and calm inflamed skin. Use lukewarm water and mild soaps that have  added oils, fats, and moisturizers. Avoid deodorants, antiperspirants, and antibacterial soaps. These have a drying effect. Many people find it helpful to soak in a tub with added bath oils, oatmeal, apple cider vinegar, or Epsom salts.  · After bathing, put on skin cream (or a skin oil for a stronger effect).  · Some exposure to UV rays from the sun can improve psoriasis. But too much sun can trigger an outbreak. It also raises your risk for skin cancer. Limit sun exposure and use sunscreen on healthy skin (at least 15 SPF).  · If you are prescribed medicine, take it as directed.  · Unless another steroid cream was prescribed, you may use over-the-counter hydrocortisone cream for a few weeks during symptom flare-ups.  · Stop smoking. If you are a long-time smoker, this can be hard. Think about joining a stop-smoking program.  · Tell your provider if your joints start to ache or get stiff.  · Tell your provider if you notice changes in your fingernails.  · Depression is more common among psoriasis patients. Get help if you notice changes in your mood.  Follow-up care  Follow up with your healthcare provider, or as advised.  When to seek medical advice  Call your healthcare provider right away if any of these occur:  · Skin pain gets worse  · Bleeding from the skin plaques that is hard to control  · Signs of skin infection (redness, increasing pain, swelling, pus)  · Fever of 1 degree, or higher, above your normal temperature, or as directed by your provider  Date Last Reviewed: 8/1/2016  © 9938-2022 [employer]. [address] [place] [address], [place] [address]. All rights reserved. This information is not intended as a substitute for professional medical care. Always follow your healthcare professional's instructions.

## 2017-11-20 LAB — FUNGUS BLD CULT: NORMAL

## 2017-12-22 ENCOUNTER — OFFICE VISIT (OUTPATIENT)
Dept: NEUROLOGY | Facility: CLINIC | Age: 57
End: 2017-12-22
Payer: COMMERCIAL

## 2017-12-22 VITALS
DIASTOLIC BLOOD PRESSURE: 84 MMHG | WEIGHT: 203.5 LBS | HEART RATE: 76 BPM | BODY MASS INDEX: 30.14 KG/M2 | HEIGHT: 69 IN | SYSTOLIC BLOOD PRESSURE: 124 MMHG

## 2017-12-22 DIAGNOSIS — R41.3 MEMORY LOSS: ICD-10-CM

## 2017-12-22 PROCEDURE — 99204 OFFICE O/P NEW MOD 45 MIN: CPT | Mod: S$GLB,,, | Performed by: PSYCHIATRY & NEUROLOGY

## 2017-12-22 PROCEDURE — 99999 PR PBB SHADOW E&M-EST. PATIENT-LVL III: CPT | Mod: PBBFAC,,, | Performed by: PSYCHIATRY & NEUROLOGY

## 2017-12-22 NOTE — LETTER
December 22, 2017      AN Bonds  [address]  [facility] [address]           [facility] Neurology  [address] [facility] [address]  Phone: [phone]  Fax: [phone]          Patient: Lynn Turner   MR Number: [id number]   YOB: 1960   Date of Visit: 12/22/2017       Dear Suri Huston:    Thank you for referring Lynn Turner to me for evaluation. Attached you will find relevant portions of my assessment and plan of care.    If you have questions, please do not hesitate to call me. I look forward to following Lynn Turner along with you.    Sincerely,    Braulio Bowling MD    Enclosure  CC:  No Recipients    If you would like to receive this communication electronically, please contact [email or web address] or [phone] to request more information on [employer] Link access.    For providers and/or their staff who would like to refer a patient to [facility], please contact us through our one-stop-shop provider referral line, [facility] , at [phone].    If you feel you have received this communication in error or would no longer like to receive these types of communications, please e-mail [email or web address]

## 2017-12-22 NOTE — PROGRESS NOTES
"This is a 57-year-old left-handed patient who indicates that she's been having some difficulty with her recent memory.    She gives several examples that have occurred recently.  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  The patient just forgot to go to work that day.  On another occasion, she discovered that the windows were down in her car when she does not remember putting them down.  The patient states that occasionally while driving she may forget where she is going and does not recognize her environment for several seconds.    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  The patient states that on occasion she is forgot to take her medications.  She also indicates that occasionally she will stumble over words in conversation.    The patient is not aware of any change in walking or standing balance.  The patient denies any weakness, awkwardness, or clumsiness of her upper or lower extremities.  The patient does have a prior history of migraine with aura and recently he has had an occasional sensation of the visual aura which is described as a "gray curtain covering my left eye".  The patient states that this has not been accompanied by any migraine headache recently however.    On the other hand, she has had several episodes of a sudden sharp left parietal headache pain that will last 1-2 minutes.  This has occurred randomly and has even occurred waking her from her sleep.      ROS:  GENERAL: No fever, chills, fatigability or weight loss.  SKIN: No rashes, itching or changes in color or texture of skin.  HEAD: No  recent head trauma.  EYES: Visual acuity fine. No photophobia, ocular pain or diplopia.  EARS: Denies ear pain, discharge or vertigo.  NOSE: No loss of smell, no epistaxis or postnasal drip.  MOUTH " & THROAT: No hoarseness or change in voice. No excessive gum bleeding.  NODES: Denies swollen glands.  CHEST: Denies CARR, cyanosis, wheezing, cough and sputum production.  CARDIOVASCULAR: Denies chest pain, PND, orthopnea or reduced exercise tolerance.  ABDOMEN: Appetite fine. No weight loss. Denies diarrhea, abdominal pain, hematemesis or blood in stool.  URINARY: No flank pain, dysuria or hematuria.  PERIPHERAL VASCULAR: No claudication or cyanosis.  MUSCULOSKELETAL: No joint stiffness or swelling. Denies back pain.  NEUROLOGIC: No history of seizures, paralysis, alteration of gait or coordination.    PAST HISTORY:  Surgery: Hysterectomy, right knee arthroscope, benign right breast biopsy, ambulation for supraventricular tachycardia  Medical: History of supraventricular tachycardia, right cuspid aortic valve, hypertension, history of tobacco use  ALLERGIES: Penicillin, tetracycline, erythromycin, ibuprofen    FAMILY HISTORY:  The patient's father is living but has a history of coronary artery disease and stroke.  Her mother  at age 77 with diabetes and a history of recurrent strokes.  A maternal uncle may have had Alzheimer-like dementia.    SOCIAL HISTORY:  The patient is  and lives with her .  She is a former smoker who started smoking at age 16 but smoked less than 1 pack a day for a number of years.  She has been able to quit smoking at this time by utilizing Chantix.    PE:   VITAL SIGNS: Blood pressure 124/84, pulse 76, weight 92.3 kg, height 5 foot 9 inches, BMI 30.05  APPEARANCE: Well nourished, well developed, in no acute distress.    HEAD: Normocephalic, atraumatic.  EYES: PERRL. EOMI.  Non-icteric sclerae.    EARS: TM's intact. Light reflex normal. No retraction or perforation.    NOSE: Mucosa pink. Airway clear.  MOUTH & THROAT: No tonsillar enlargement. No pharyngeal erythema or exudate. No stridor.  NECK: Supple. No bruits.  CHEST: Lungs clear to auscultation.  CARDIOVASCULAR:  Regular rhythm without significant murmurs.  ABDOMEN: Bowel sounds normal. Not distended. Soft. No tenderness or masses.  MUSCULOSKELETAL:  No bony deformity seen.  Muscle tone and muscle mass are normal in both upper and both lower extremities.  NEUROLOGIC:   Mental Status:  The patient is well oriented to person, time, place, and situation.  The patient scored 29 out of 30 on the [place] cognitive assessment.  She lost 1 point because of difficulty with the three-dimensional blocks diagram.  The patient is attentive to the environment and cooperative for the exam.  Cranial Nerves: II-XII grossly intact. Fundoscopic exam is normal.  No hemorrhage, exudate or papilledema is present. The extraocular muscles are intact in the cardinal directions of gaze.  No ptosis is present. Facial features are symmetrical.  Speech is normal in fluency, diction, and phrasing.  Tongue protrudes in the midline.    Gait and Station:  Romberg is negative.  Good alternate armswing with normal gait.  Motor:  No downdrift of either arm when held at shoulder level.  Manual muscle testing of proximal and distal muscles of both upper and lower extremities is normal. Muscle mass is normal.  Muscle tone is normal.  Sensory:  Intact both upper and lower extremities to pin prick, touch, and vibration.  Cerebellar:  Finger to nose done well.  Alternating movements intact.  No involuntary movements or tremor seen.  Reflexes:  Stretch reflexes are 2+ both upper and lower extremities.  Plantar stimulation is flexor bilaterally and no pathological reflexes are seen      ASSESSMENT:  1.  Benign memory loss  2.  History of supraventricular tachycardia  3.  Hypertension, hyperlipidemia    RECOMMENDATIONS:  1.  The patient is to continue medications as previously prescribed.  She is already had MRI of the brain done which was normal.  Her routine labs including B12, thyroid studies, RPR, and other routine lab is all normal.    2.  Return to neurology as  needed.  This note is generated with speech recognition software and is subject to transcription error and sound alike phrases that may be missed by proofreading.

## 2018-01-11 ENCOUNTER — OFFICE VISIT (OUTPATIENT)
Dept: OPHTHALMOLOGY | Facility: CLINIC | Age: 58
End: 2018-01-11
Payer: COMMERCIAL

## 2018-01-11 DIAGNOSIS — H52.4 MYOPIA WITH ASTIGMATISM AND PRESBYOPIA, BILATERAL: ICD-10-CM

## 2018-01-11 DIAGNOSIS — D31.31 CHOROIDAL NEVUS OF RIGHT EYE: Primary | ICD-10-CM

## 2018-01-11 DIAGNOSIS — H01.02B SQUAMOUS BLEPHARITIS OF UPPER AND LOWER EYELIDS OF BOTH EYES: ICD-10-CM

## 2018-01-11 DIAGNOSIS — H01.02A SQUAMOUS BLEPHARITIS OF UPPER AND LOWER EYELIDS OF BOTH EYES: ICD-10-CM

## 2018-01-11 DIAGNOSIS — H52.203 MYOPIA WITH ASTIGMATISM AND PRESBYOPIA, BILATERAL: ICD-10-CM

## 2018-01-11 DIAGNOSIS — H52.13 MYOPIA WITH ASTIGMATISM AND PRESBYOPIA, BILATERAL: ICD-10-CM

## 2018-01-11 PROCEDURE — 92015 DETERMINE REFRACTIVE STATE: CPT | Mod: S$GLB,,, | Performed by: OPTOMETRIST

## 2018-01-11 PROCEDURE — 92250 FUNDUS PHOTOGRAPHY W/I&R: CPT | Mod: S$GLB,,, | Performed by: OPTOMETRIST

## 2018-01-11 PROCEDURE — 92014 COMPRE OPH EXAM EST PT 1/>: CPT | Mod: S$GLB,,, | Performed by: OPTOMETRIST

## 2018-01-11 PROCEDURE — 99999 PR PBB SHADOW E&M-EST. PATIENT-LVL I: CPT | Mod: PBBFAC,,, | Performed by: OPTOMETRIST

## 2018-01-11 NOTE — PROGRESS NOTES
HPI     Pts last exam was 11/18/16 with DNL. PT c/o blurred overall va and wears   gls full time.   Nevus OD  HPI    Any vision changes since last exam: no  Eye pain: no  Other ocular symptoms: itching and irritation for about 3 weeks, unable to   find sterilid cleanser recently. Using systane prn with temporary relief  Do you wear currently wear glasses or contacts? gls     Interested in contacts today? no     Do you plan on getting new glasses today? If needed    Last edited by Ree Healy MA on 1/11/2018  9:55 AM. (History)            Assessment /Plan     For exam results, see Encounter Report.    Choroidal nevus of right eye  -     Color Fundus Photography - OU - Both Eyes  Stable in size and appearance  No changes compared to last year  Monitor 12 months    Squamous blepharitis of upper and lower eyelids of both eyes  Does well with sterilid  Gave coupon with picture of new packaging  Continue qd OU    Myopia with astigmatism and presbyopia, bilateral  Eyeglass Final Rx     Eyeglass Final Rx       Sphere Cylinder Axis Dist VA Add    Right -3.00 +0.75 125 20/20 +2.00    Left -2.25 +0.25 045 20/20 +2.00    Expiration Date:  1/12/2019              RTC 1 yr for dilated eye exam and optos or PRN if any problems.   Discussed above and answered questions.

## 2018-03-05 ENCOUNTER — HOSPITAL ENCOUNTER (OUTPATIENT)
Dept: RADIOLOGY | Facility: HOSPITAL | Age: 58
Discharge: HOME | End: 2018-03-05
Attending: NURSE PRACTITIONER
Payer: COMMERCIAL

## 2018-03-05 ENCOUNTER — TELEPHONE (OUTPATIENT)
Dept: URGENT CARE | Facility: CLINIC | Age: 58
End: 2018-03-05

## 2018-03-05 ENCOUNTER — TELEPHONE (OUTPATIENT)
Dept: INTERNAL MEDICINE | Facility: CLINIC | Age: 58
End: 2018-03-05

## 2018-03-05 ENCOUNTER — OFFICE VISIT (OUTPATIENT)
Dept: URGENT CARE | Facility: CLINIC | Age: 58
End: 2018-03-05
Payer: COMMERCIAL

## 2018-03-05 VITALS
BODY MASS INDEX: 32.16 KG/M2 | TEMPERATURE: 98 F | DIASTOLIC BLOOD PRESSURE: 70 MMHG | OXYGEN SATURATION: 97 % | RESPIRATION RATE: 18 BRPM | HEART RATE: 74 BPM | SYSTOLIC BLOOD PRESSURE: 140 MMHG | HEIGHT: 69 IN | WEIGHT: 217.13 LBS

## 2018-03-05 DIAGNOSIS — R16.0 HEPATOMEGALY: ICD-10-CM

## 2018-03-05 DIAGNOSIS — R10.11 ABDOMINAL PAIN, ACUTE, RIGHT UPPER QUADRANT: Primary | ICD-10-CM

## 2018-03-05 DIAGNOSIS — R10.11 ABDOMINAL PAIN, ACUTE, RIGHT UPPER QUADRANT: ICD-10-CM

## 2018-03-05 PROCEDURE — 76700 US EXAM ABDOM COMPLETE: CPT | Mod: TC,PO

## 2018-03-05 PROCEDURE — 99214 OFFICE O/P EST MOD 30 MIN: CPT | Mod: S$GLB,,, | Performed by: NURSE PRACTITIONER

## 2018-03-05 PROCEDURE — 3077F SYST BP >= 140 MM HG: CPT | Mod: S$GLB,,, | Performed by: NURSE PRACTITIONER

## 2018-03-05 PROCEDURE — 76700 US EXAM ABDOM COMPLETE: CPT | Mod: 26,,, | Performed by: RADIOLOGY

## 2018-03-05 PROCEDURE — 99999 PR PBB SHADOW E&M-EST. PATIENT-LVL IV: CPT | Mod: PBBFAC,,, | Performed by: NURSE PRACTITIONER

## 2018-03-05 PROCEDURE — 3078F DIAST BP <80 MM HG: CPT | Mod: S$GLB,,, | Performed by: NURSE PRACTITIONER

## 2018-03-05 NOTE — TELEPHONE ENCOUNTER
----- Message from Agustin Cote NP sent at 3/5/2018  3:19 PM CST -----  Please let pt know her US showed an enlarged liver and a small gallbladder polyp. Next step is to f/u with GI. Referral put in epic. Please schedule.  Thanks.

## 2018-03-05 NOTE — TELEPHONE ENCOUNTER
----- Message from Chayo Cerrato sent at 3/5/2018  7:45 AM CST -----  Contact: pt  Pt having sharp pains under ribcage on right side. Pt would like to be seen today. Please call pt at [phone]

## 2018-03-05 NOTE — PATIENT INSTRUCTIONS
Cholecystitis (Presumed)    Your abdominal pain may be due to an inflammation and possible infection in the gallbladder. This is called cholecystitis. The gallbladder is a small sac under the liver, which stores and releases bile. Bile is a fluid that aids in the digestion of fat. Eating fatty food stimulates the gallbladder to contract, and release the bile. A gallstone may form in this sac. Although most people do not have symptoms, when the stone moves and blocks the passage of bile out of the bladder, it can cause pain and even an infection. Bile sludge without a stone can also cause cholecystitis.  To be more certain of the diagnosis, you may need to have an ultrasound, CT-scan or other special test.  A number of things increase the risk of developing gallstones:  · Women are more likely to have the problem  · Obesity  · Increasing age  · Losing or gaining weight quickly  · High calorie diet  · Pregnancy  · Hormone therapy  · Diabetes  The most common symptoms are:  · Abdominal pain, cramping, aching  · Nausea, vomiting  · Fever  Many illnesses can cause these symptoms. This pain usually starts in the upper right side of your abdomen. Sometimes it can radiate to your right shoulder, back and arm. It usually starts suddenly, becomes more intense quickly, and then gradually decreases and disappears over a couple of hours. Elderly people and diabetics may have difficulty showing where the pain is exactly.  Home care  · Rest in bed and follow a clear liquid diet until the pain, nausea and vomiting go away.  · Antibiotics and other medicine may be prescribed. Take these exactly as directed.  · You can take acetaminophen or ibuprofen for pain, unless you were given a different pain medicine to use. Note: If you have chronic liver or kidney disease or ever had a stomach ulcer or GI bleeding or are taking blood thinner medications, talk with your doctor before using these medicines.  · Fat in your diet makes the  gallbladder contract and may cause increased pain. Therefore, avoid fat in your diet over the next two days and follow a low-fat diet thereafter. If you are overweight, a low fat diet will help you lose weight.  Follow-up care  An infection in the gallbladder is a serious problem and must be watched carefully. Keep any appointments made for you to have further testing. See your doctor for another exam in the next 1-2 days, or as directed by our staff. Once cholecystitis has occurred, removal of the gallbladder is usually required to prevent a recurrence. You can discuss this at your follow-up visit.  When to seek medical advice  Call your healthcare provider if any of the following occur:  · Repeated vomiting  · Swelling of the abdomen  · Pain lasts over 6 hours  · Fever of 100.4°F (38ºC) or higher, or as directed by your healthcare provider  · Weakness, dizziness or fainting  · Dark urine or light colored stools  · Yellow color of the skin or eyes  · Chest, arm, back, neck or jaw pain  Date Last Reviewed: 8/23/2015 © 2000-2017 [employer]. [address] [place] [address], [place] [address]. All rights reserved. This information is not intended as a substitute for professional medical care. Always follow your healthcare professional's instructions.

## 2018-03-06 ENCOUNTER — INITIAL CONSULT (OUTPATIENT)
Dept: GASTROENTEROLOGY | Facility: CLINIC | Age: 58
End: 2018-03-06
Payer: COMMERCIAL

## 2018-03-06 ENCOUNTER — LAB VISIT (OUTPATIENT)
Dept: LAB | Facility: HOSPITAL | Age: 58
End: 2018-03-06
Attending: NURSE PRACTITIONER
Payer: COMMERCIAL

## 2018-03-06 VITALS
DIASTOLIC BLOOD PRESSURE: 84 MMHG | WEIGHT: 201.25 LBS | HEIGHT: 69 IN | SYSTOLIC BLOOD PRESSURE: 146 MMHG | BODY MASS INDEX: 29.81 KG/M2 | HEART RATE: 54 BPM

## 2018-03-06 DIAGNOSIS — R10.13 EPIGASTRIC ABDOMINAL PAIN: ICD-10-CM

## 2018-03-06 DIAGNOSIS — R10.11 RUQ ABDOMINAL PAIN: ICD-10-CM

## 2018-03-06 DIAGNOSIS — R10.13 EPIGASTRIC ABDOMINAL PAIN: Primary | ICD-10-CM

## 2018-03-06 LAB
ALBUMIN SERPL BCP-MCNC: 4.1 G/DL
ALP SERPL-CCNC: 93 U/L
ALT SERPL W/O P-5'-P-CCNC: 18 U/L
ANION GAP SERPL CALC-SCNC: 10 MMOL/L
AST SERPL-CCNC: 16 U/L
BASOPHILS # BLD AUTO: 0.1 K/UL
BASOPHILS NFR BLD: 1.1 %
BILIRUB SERPL-MCNC: 0.3 MG/DL
BUN SERPL-MCNC: 15 MG/DL
CALCIUM SERPL-MCNC: 9.5 MG/DL
CHLORIDE SERPL-SCNC: 105 MMOL/L
CO2 SERPL-SCNC: 26 MMOL/L
CREAT SERPL-MCNC: 0.8 MG/DL
DIFFERENTIAL METHOD: ABNORMAL
EOSINOPHIL # BLD AUTO: 0.3 K/UL
EOSINOPHIL NFR BLD: 2.9 %
ERYTHROCYTE [DISTWIDTH] IN BLOOD BY AUTOMATED COUNT: 13.7 %
EST. GFR  (AFRICAN AMERICAN): >60 ML/MIN/1.73 M^2
EST. GFR  (NON AFRICAN AMERICAN): >60 ML/MIN/1.73 M^2
GLUCOSE SERPL-MCNC: 83 MG/DL
HCT VFR BLD AUTO: 42.3 %
HGB BLD-MCNC: 13.5 G/DL
IMM GRANULOCYTES # BLD AUTO: 0.03 K/UL
IMM GRANULOCYTES NFR BLD AUTO: 0.3 %
LYMPHOCYTES # BLD AUTO: 2.9 K/UL
LYMPHOCYTES NFR BLD: 31.9 %
MCH RBC QN AUTO: 29.7 PG
MCHC RBC AUTO-ENTMCNC: 31.9 G/DL
MCV RBC AUTO: 93 FL
MONOCYTES # BLD AUTO: 0.7 K/UL
MONOCYTES NFR BLD: 8.2 %
NEUTROPHILS # BLD AUTO: 5 K/UL
NEUTROPHILS NFR BLD: 55.6 %
NRBC BLD-RTO: 0 /100 WBC
PLATELET # BLD AUTO: 296 K/UL
PMV BLD AUTO: 11 FL
POTASSIUM SERPL-SCNC: 4.2 MMOL/L
PROT SERPL-MCNC: 7.5 G/DL
RBC # BLD AUTO: 4.54 M/UL
SODIUM SERPL-SCNC: 141 MMOL/L
WBC # BLD AUTO: 9.03 K/UL

## 2018-03-06 PROCEDURE — 3079F DIAST BP 80-89 MM HG: CPT | Mod: S$GLB,,, | Performed by: NURSE PRACTITIONER

## 2018-03-06 PROCEDURE — 80053 COMPREHEN METABOLIC PANEL: CPT

## 2018-03-06 PROCEDURE — 85025 COMPLETE CBC W/AUTO DIFF WBC: CPT

## 2018-03-06 PROCEDURE — 99204 OFFICE O/P NEW MOD 45 MIN: CPT | Mod: S$GLB,,, | Performed by: NURSE PRACTITIONER

## 2018-03-06 PROCEDURE — 99999 PR PBB SHADOW E&M-EST. PATIENT-LVL III: CPT | Mod: PBBFAC,,, | Performed by: NURSE PRACTITIONER

## 2018-03-06 PROCEDURE — 36415 COLL VENOUS BLD VENIPUNCTURE: CPT | Mod: PO

## 2018-03-06 PROCEDURE — 3077F SYST BP >= 140 MM HG: CPT | Mod: S$GLB,,, | Performed by: NURSE PRACTITIONER

## 2018-03-06 RX ORDER — OMEPRAZOLE 40 MG/1
40 CAPSULE, DELAYED RELEASE ORAL DAILY
Qty: 30 CAPSULE | Refills: 11 | Status: SHIPPED | OUTPATIENT
Start: 2018-03-06 | End: 2018-09-26

## 2018-03-06 NOTE — LETTER
March 7, 2018      Agustin Cote, NP  [address] [place] [address]           [facility]  [address] [place] [address]  Phone: [phone]  Fax: [phone]          Patient: Lynn Turner   MR Number: [id number]   YOB: 1960   Date of Visit: 3/6/2018       Dear Agustin Cote:    Thank you for referring Lynn Turner to me for evaluation. Attached you will find relevant portions of my assessment and plan of care.    If you have questions, please do not hesitate to call me. I look forward to following Lynn Turner along with you.    Sincerely,    Ara Thorne, [facility]    Enclosure  CC:  No Recipients    If you would like to receive this communication electronically, please contact [email or web address] or [phone] to request more information on [employer] Link access.    For providers and/or their staff who would like to refer a patient to [facility], please contact us through our one-stop-shop provider referral line, [facility], at [phone].    If you feel you have received this communication in error or would no longer like to receive these types of communications, please e-mail [email or web address]

## 2018-03-07 NOTE — PROGRESS NOTES
Clinic Consult:  [facility] Consultation Note    Reason for Consult:  The primary encounter diagnosis was Epigastric abdominal pain. A diagnosis of RUQ abdominal pain was also pertinent to this visit.    PCP: Atilio Howard   [address] / [place] [id number]    HPI:  This is a 57 y.o. female here for evaluation of the above  She reports that over the last few weeks she has had persistent RUQ abdominal pain.  Pain is constant through the day, occasionally worse after meals, however, it has woken her from sleep multiple times.  She denies any known exacerbating or reliving factors.  She is not currently on any medication for the symptoms.  No new medications.  No NSAIDs or ETOH.   No melena or hematochezia.   No change in bowel pattern.       Review of Systems   Constitutional: Negative for chills, fever, malaise/fatigue and weight loss.   Respiratory: Negative for cough.    Cardiovascular: Negative for chest pain.   Gastrointestinal:        Per HPI   Musculoskeletal: Negative for myalgias.   Skin: Negative for itching and rash.   Neurological: Negative for headaches.   Psychiatric/Behavioral: The patient is not nervous/anxious.        Medical History:   Past Medical History:   Diagnosis Date    Aortic insufficiency     Bicuspid aortic valve     Hypertension     diet controlled    Palpitations     SVT (supraventricular tachycardia)     Tobacco use        Surgical History:  Past Surgical History:   Procedure Laterality Date    BREAST SURGERY      CARDIAC ELECTROPHYSIOLOGY STUDY AND ABLATION  2013    COLONOSCOPY N/A 1/14/2016    Procedure: COLONOSCOPY;  Surgeon: Sushant Gutierrez MD;  Location: [facility];  Service: Endoscopy;  Laterality: N/A;    HYSTERECTOMY  1995    partial    KNEE ARTHROSCOPY Right        Family History:   Family History   Problem Relation Age of Onset    Cataracts Mother     Stroke Mother     Diabetes Mother     Hypertension Mother     Hyperlipidemia Mother      "Macular degeneration Father     Cataracts Father     Heart disease Father     Coronary artery disease Father     Diabetes Father     Hypertension Father        Social History:   Social History   Substance Use Topics    Smoking status: Former Smoker     Packs/day: 1.00     Years: 40.00     Quit date: 8/16/2017    Smokeless tobacco: Never Used    Alcohol use Yes      Comment: occasional       Allergies: Reviewed    Home Medications:   Current Outpatient Prescriptions on File Prior to Visit   Medication Sig Dispense Refill    aspirin 325 MG tablet Take 325 mg by mouth once daily.      losartan (COZAAR) 50 MG tablet Take 1 tablet (50 mg total) by mouth once daily. 90 tablet 3    metoprolol succinate (TOPROL-XL) 25 MG 24 hr tablet TK 1 T PO QD  12    rosuvastatin (CRESTOR) 5 MG tablet Take 1 tablet (5 mg total) by mouth once daily. 90 tablet 3    VIT C/E/ZN/COPPR/LUTEIN/ZEAXAN (PRESERVISION AREDS 2 ORAL) Take by mouth.       No current facility-administered medications on file prior to visit.        Physical Exam:  Vital Signs:  BP (!) 146/84   Pulse (!) 54   Ht 5' 9" (1.753 m)   Wt 91.3 kg (201 lb 4.5 oz)   BMI 29.72 kg/m²   Body mass index is 29.72 kg/m².  Physical Exam   Constitutional: She is oriented to person, place, and time. She appears well-developed and well-nourished.   Eyes: No scleral icterus.   Neck: Normal range of motion.   Cardiovascular: Normal rate and regular rhythm.    Pulmonary/Chest: Effort normal and breath sounds normal.   Abdominal: Soft. Bowel sounds are normal. She exhibits no distension. There is tenderness (extreme tenderness to epigastric area and RUQ).   Musculoskeletal: Normal range of motion.   Neurological: She is alert and oriented to person, place, and time.   Skin: Skin is warm and dry.   Psychiatric: She has a normal mood and affect.   Vitals reviewed.      Labs: Pertinent labs reviewed.    Assessment:  1. Epigastric abdominal pain    2. RUQ abdominal pain       "   Recommendations:  - Gastritis vs PUD vs H. Pylori vs GBD  - Start on PPI today  - Plan for EGD  - If EGD is unremarkable will get HIDA to R/O GBD  Epigastric abdominal pain  -     CBC auto differential; Future; Expected date: 03/06/2018  -     Comprehensive metabolic panel; Future; Expected date: 03/06/2018  -     omeprazole (PRILOSEC) 40 MG capsule; Take 1 capsule (40 mg total) by mouth once daily.  Dispense: 30 capsule; Refill: 11  -     Case request GI: ESOPHAGOGASTRODUODENOSCOPY (EGD)    RUQ abdominal pain  -     Case request GI: ESOPHAGOGASTRODUODENOSCOPY (EGD)      Follow up to be determined by results of procedure.        Thank you so much for allowing me to participate in the care of VERN Dean

## 2018-03-12 ENCOUNTER — ANESTHESIA (OUTPATIENT)
Dept: ENDOSCOPY | Facility: HOSPITAL | Age: 58
End: 2018-03-12
Payer: COMMERCIAL

## 2018-03-12 ENCOUNTER — PATIENT MESSAGE (OUTPATIENT)
Dept: GASTROENTEROLOGY | Facility: CLINIC | Age: 58
End: 2018-03-12

## 2018-03-12 ENCOUNTER — ANESTHESIA EVENT (OUTPATIENT)
Dept: ENDOSCOPY | Facility: HOSPITAL | Age: 58
End: 2018-03-12
Payer: COMMERCIAL

## 2018-03-12 ENCOUNTER — SURGERY (OUTPATIENT)
Age: 58
End: 2018-03-12

## 2018-03-12 ENCOUNTER — HOSPITAL ENCOUNTER (OUTPATIENT)
Facility: HOSPITAL | Age: 58
Discharge: HOME | End: 2018-03-12
Attending: INTERNAL MEDICINE | Admitting: INTERNAL MEDICINE
Payer: COMMERCIAL

## 2018-03-12 VITALS
TEMPERATURE: 98 F | WEIGHT: 200 LBS | SYSTOLIC BLOOD PRESSURE: 108 MMHG | DIASTOLIC BLOOD PRESSURE: 61 MMHG | HEART RATE: 55 BPM | OXYGEN SATURATION: 99 % | BODY MASS INDEX: 29.62 KG/M2 | RESPIRATION RATE: 16 BRPM | HEIGHT: 69 IN

## 2018-03-12 DIAGNOSIS — R10.13 EPIGASTRIC PAIN: ICD-10-CM

## 2018-03-12 PROCEDURE — 43239 EGD BIOPSY SINGLE/MULTIPLE: CPT | Performed by: INTERNAL MEDICINE

## 2018-03-12 PROCEDURE — 37000009 HC ANESTHESIA EA ADD 15 MINS: Performed by: INTERNAL MEDICINE

## 2018-03-12 PROCEDURE — 25000003 PHARM REV CODE 250: Performed by: INTERNAL MEDICINE

## 2018-03-12 PROCEDURE — 25000003 PHARM REV CODE 250: Performed by: NURSE ANESTHETIST, CERTIFIED REGISTERED

## 2018-03-12 PROCEDURE — 37000008 HC ANESTHESIA 1ST 15 MINUTES: Performed by: INTERNAL MEDICINE

## 2018-03-12 PROCEDURE — 43239 EGD BIOPSY SINGLE/MULTIPLE: CPT | Mod: ,,, | Performed by: INTERNAL MEDICINE

## 2018-03-12 PROCEDURE — 88305 TISSUE EXAM BY PATHOLOGIST: CPT | Performed by: PATHOLOGY

## 2018-03-12 PROCEDURE — 88305 TISSUE EXAM BY PATHOLOGIST: CPT | Mod: 26,,, | Performed by: PATHOLOGY

## 2018-03-12 PROCEDURE — 27201012 HC FORCEPS, HOT/COLD, DISP: Performed by: INTERNAL MEDICINE

## 2018-03-12 PROCEDURE — 63600175 PHARM REV CODE 636 W HCPCS: Performed by: NURSE ANESTHETIST, CERTIFIED REGISTERED

## 2018-03-12 RX ORDER — PROPOFOL 10 MG/ML
VIAL (ML) INTRAVENOUS
Status: DISCONTINUED | OUTPATIENT
Start: 2018-03-12 | End: 2018-03-12

## 2018-03-12 RX ORDER — GLYCOPYRROLATE 0.2 MG/ML
INJECTION INTRAMUSCULAR; INTRAVENOUS
Status: DISCONTINUED | OUTPATIENT
Start: 2018-03-12 | End: 2018-03-12

## 2018-03-12 RX ORDER — SODIUM CHLORIDE, SODIUM LACTATE, POTASSIUM CHLORIDE, CALCIUM CHLORIDE 600; 310; 30; 20 MG/100ML; MG/100ML; MG/100ML; MG/100ML
INJECTION, SOLUTION INTRAVENOUS CONTINUOUS
Status: DISCONTINUED | OUTPATIENT
Start: 2018-03-12 | End: 2018-03-12 | Stop reason: HOSPADM

## 2018-03-12 RX ORDER — LIDOCAINE HYDROCHLORIDE 10 MG/ML
INJECTION INFILTRATION; PERINEURAL
Status: DISCONTINUED | OUTPATIENT
Start: 2018-03-12 | End: 2018-03-12

## 2018-03-12 RX ADMIN — GLYCOPYRROLATE 0.2 MG: 0.2 INJECTION INTRAMUSCULAR; INTRAVENOUS at 11:03

## 2018-03-12 RX ADMIN — SODIUM CHLORIDE, SODIUM LACTATE, POTASSIUM CHLORIDE, AND CALCIUM CHLORIDE: 600; 310; 30; 20 INJECTION, SOLUTION INTRAVENOUS at 10:03

## 2018-03-12 RX ADMIN — PROPOFOL 150 MG: 10 INJECTION, EMULSION INTRAVENOUS at 11:03

## 2018-03-12 RX ADMIN — SODIUM CHLORIDE, SODIUM LACTATE, POTASSIUM CHLORIDE, AND CALCIUM CHLORIDE: 600; 310; 30; 20 INJECTION, SOLUTION INTRAVENOUS at 11:03

## 2018-03-12 RX ADMIN — LIDOCAINE HYDROCHLORIDE 30 MG: 10 INJECTION, SOLUTION INFILTRATION; PERINEURAL at 11:03

## 2018-03-12 NOTE — H&P (VIEW-ONLY)
Clinic Consult:  [facility] Consultation Note    Reason for Consult:  The primary encounter diagnosis was Epigastric abdominal pain. A diagnosis of RUQ abdominal pain was also pertinent to this visit.    PCP: Atilio Howard   [address] / [place] [address]    HPI:  This is a 57 y.o. female here for evaluation of the above  She reports that over the last few weeks she has had persistent RUQ abdominal pain.  Pain is constant through the day, occasionally worse after meals, however, it has woken her from sleep multiple times.  She denies any known exacerbating or reliving factors.  She is not currently on any medication for the symptoms.  No new medications.  No NSAIDs or ETOH.   No melena or hematochezia.   No change in bowel pattern.       Review of Systems   Constitutional: Negative for chills, fever, malaise/fatigue and weight loss.   Respiratory: Negative for cough.    Cardiovascular: Negative for chest pain.   Gastrointestinal:        Per HPI   Musculoskeletal: Negative for myalgias.   Skin: Negative for itching and rash.   Neurological: Negative for headaches.   Psychiatric/Behavioral: The patient is not nervous/anxious.        Medical History:   Past Medical History:   Diagnosis Date    Aortic insufficiency     Bicuspid aortic valve     Hypertension     diet controlled    Palpitations     SVT (supraventricular tachycardia)     Tobacco use        Surgical History:  Past Surgical History:   Procedure Laterality Date    BREAST SURGERY      CARDIAC ELECTROPHYSIOLOGY STUDY AND ABLATION  2013    COLONOSCOPY N/A 1/14/2016    Procedure: COLONOSCOPY;  Surgeon: Sushant Gutierrez MD;  Location: [facility];  Service: Endoscopy;  Laterality: N/A;    HYSTERECTOMY  1995    partial    KNEE ARTHROSCOPY Right        Family History:   Family History   Problem Relation Age of Onset    Cataracts Mother     Stroke Mother     Diabetes Mother     Hypertension Mother     Hyperlipidemia Mother      "Macular degeneration Father     Cataracts Father     Heart disease Father     Coronary artery disease Father     Diabetes Father     Hypertension Father        Social History:   Social History   Substance Use Topics    Smoking status: Former Smoker     Packs/day: 1.00     Years: 40.00     Quit date: 8/16/2017    Smokeless tobacco: Never Used    Alcohol use Yes      Comment: occasional       Allergies: Reviewed    Home Medications:   Current Outpatient Prescriptions on File Prior to Visit   Medication Sig Dispense Refill    aspirin 325 MG tablet Take 325 mg by mouth once daily.      losartan (COZAAR) 50 MG tablet Take 1 tablet (50 mg total) by mouth once daily. 90 tablet 3    metoprolol succinate (TOPROL-XL) 25 MG 24 hr tablet TK 1 T PO QD  12    rosuvastatin (CRESTOR) 5 MG tablet Take 1 tablet (5 mg total) by mouth once daily. 90 tablet 3    VIT C/E/ZN/COPPR/LUTEIN/ZEAXAN (PRESERVISION AREDS 2 ORAL) Take by mouth.       No current facility-administered medications on file prior to visit.        Physical Exam:  Vital Signs:  BP (!) 146/84   Pulse (!) 54   Ht 5' 9" (1.753 m)   Wt 91.3 kg (201 lb 4.5 oz)   BMI 29.72 kg/m²   Body mass index is 29.72 kg/m².  Physical Exam   Constitutional: She is oriented to person, place, and time. She appears well-developed and well-nourished.   Eyes: No scleral icterus.   Neck: Normal range of motion.   Cardiovascular: Normal rate and regular rhythm.    Pulmonary/Chest: Effort normal and breath sounds normal.   Abdominal: Soft. Bowel sounds are normal. She exhibits no distension. There is tenderness (extreme tenderness to epigastric area and RUQ).   Musculoskeletal: Normal range of motion.   Neurological: She is alert and oriented to person, place, and time.   Skin: Skin is warm and dry.   Psychiatric: She has a normal mood and affect.   Vitals reviewed.      Labs: Pertinent labs reviewed.    Assessment:  1. Epigastric abdominal pain    2. RUQ abdominal pain       "   Recommendations:  - Gastritis vs PUD vs H. Pylori vs GBD  - Start on PPI today  - Plan for EGD  - If EGD is unremarkable will get HIDA to R/O GBD  Epigastric abdominal pain  -     CBC auto differential; Future; Expected date: 03/06/2018  -     Comprehensive metabolic panel; Future; Expected date: 03/06/2018  -     omeprazole (PRILOSEC) 40 MG capsule; Take 1 capsule (40 mg total) by mouth once daily.  Dispense: 30 capsule; Refill: 11  -     Case request GI: ESOPHAGOGASTRODUODENOSCOPY (EGD)    RUQ abdominal pain  -     Case request GI: ESOPHAGOGASTRODUODENOSCOPY (EGD)      Follow up to be determined by results of procedure.        Thank you so much for allowing me to participate in the care of VERN Dean

## 2018-03-12 NOTE — DISCHARGE INSTRUCTIONS

## 2018-03-12 NOTE — ANESTHESIA RELEASE NOTE
"Anesthesia Release from PACU Note    Patient: Lynn Turner    Procedure(s) Performed: Procedure(s) (LRB):  ESOPHAGOGASTRODUODENOSCOPY (EGD) (N/A)    Anesthesia type: MAC    Post pain: Adequate analgesia    Post assessment: no apparent anesthetic complications, tolerated procedure well and no evidence of recall    Last Vitals:   Visit Vitals  /63 (BP Location: Left arm, Patient Position: Lying)   Pulse 66   Temp 36.8 °C (98.2 °F)   Resp 16   Ht 5' 9" (1.753 m)   Wt 90.7 kg (200 lb)   SpO2 (!) 93%   Breastfeeding? No   BMI 29.53 kg/m²       Post vital signs: stable    Level of consciousness: awake, alert  and oriented    Nausea/Vomiting: no nausea/no vomiting    Complications: none    Airway Patency: patent    Respiratory: unassisted, spontaneous ventilation, room air    Cardiovascular: stable and blood pressure at baseline    Hydration: euvolemic  "

## 2018-03-12 NOTE — TRANSFER OF CARE
"Anesthesia Transfer of Care Note    Patient: Lynn Turner    Procedure(s) Performed: Procedure(s) (LRB):  ESOPHAGOGASTRODUODENOSCOPY (EGD) (N/A)    Patient location: PACU    Anesthesia Type: MAC    Transport from OR: Transported from OR on room air with adequate spontaneous ventilation    Post pain: adequate analgesia    Post assessment: no apparent anesthetic complications and tolerated procedure well    Post vital signs: stable    Level of consciousness: alert, awake and oriented    Nausea/Vomiting: no nausea/vomiting    Complications: none    Transfer of care protocol was followed      Last vitals:   Visit Vitals  /63 (BP Location: Left arm, Patient Position: Lying)   Pulse (!) 55   Temp 36.8 °C (98.2 °F) (Oral)   Resp 17   Ht 5' 9" (1.753 m)   Wt 90.7 kg (200 lb)   SpO2 97%   Breastfeeding? No   BMI 29.53 kg/m²     "

## 2018-03-12 NOTE — ANESTHESIA PREPROCEDURE EVALUATION
03/12/2018  Lynn Turner is a 57 y.o., female.    Pre-op Assessment    I have reviewed the Patient Summary Reports.     I have reviewed the Nursing Notes.   I have reviewed the Medications.     Review of Systems  Anesthesia Hx:  No problems with previous Anesthesia  Denies Family Hx of Anesthesia complications.   Denies Personal Hx of Anesthesia complications.   Social:  Smoker 1/4 ppd every other day x 40 years    Cardiovascular:   Hypertension Dysrhythmias H/o svt with ablation 2 years ago  Took toprol this am    Pulmonary:  Pulmonary Normal    Renal/:  Renal/ Normal     Hepatic/GI:  Hepatic/GI Normal    Neurological:  Neurology Normal    Endocrine:  Endocrine Normal        Physical Exam  General:  Well nourished       Chest/Lungs:  Chest/Lungs Findings: Clear to auscultation     Heart/Vascular:  Heart Findings: Rate: Normal  Rhythm: Regular Rhythm             Anesthesia Plan  Type of Anesthesia, risks & benefits discussed:  Anesthesia Type:  MAC  Patient's Preference:   Intra-op Monitoring Plan:   Intra-op Monitoring Plan Comments:   Post Op Pain Control Plan:   Post Op Pain Control Plan Comments:   Induction:   IV  Beta Blocker:  Patient is on a Beta-Blocker and has received one dose within the past 24 hours (No further documentation required).       Informed Consent: Patient understands risks and agrees with Anesthesia plan.  Questions answered. Anesthesia consent signed with patient.  ASA Score: 2     Day of Surgery Review of History & Physical: I have interviewed and examined the patient. I have reviewed the patient's H&P dated:  There are no significant changes.

## 2018-03-12 NOTE — ANESTHESIA POSTPROCEDURE EVALUATION
"Anesthesia Post Evaluation    Patient: Lynn Turner    Procedure(s) Performed: Procedure(s) (LRB):  ESOPHAGOGASTRODUODENOSCOPY (EGD) (N/A)    Final Anesthesia Type: MAC  Patient location during evaluation: PACU  Patient participation: Yes- Able to Participate  Level of consciousness: awake and alert  Post-procedure vital signs: reviewed and stable  Pain management: adequate  Airway patency: patent  PONV status at discharge: No PONV  Anesthetic complications: no      Cardiovascular status: blood pressure returned to baseline  Respiratory status: unassisted, spontaneous ventilation and room air  Hydration status: euvolemic  Follow-up needed         Visit Vitals  /63 (BP Location: Left arm, Patient Position: Lying)   Pulse 66   Temp 36.8 °C (98.2 °F)   Resp 16   Ht 5' 9" (1.753 m)   Wt 90.7 kg (200 lb)   SpO2 (!) 93%   Breastfeeding? No   BMI 29.53 kg/m²       Pain/Dolly Score: Pain Assessment Performed: Yes (3/12/2018 10:20 AM)  Presence of Pain: denies (3/12/2018 11:45 AM)  Dolly Score: 9 (3/12/2018 11:45 AM)      "

## 2018-03-25 ENCOUNTER — HOSPITAL ENCOUNTER (EMERGENCY)
Facility: HOSPITAL | Age: 58
Discharge: HOME | End: 2018-03-25
Attending: EMERGENCY MEDICINE
Payer: COMMERCIAL

## 2018-03-25 VITALS
SYSTOLIC BLOOD PRESSURE: 148 MMHG | RESPIRATION RATE: 18 BRPM | BODY MASS INDEX: 29.8 KG/M2 | HEART RATE: 60 BPM | OXYGEN SATURATION: 98 % | TEMPERATURE: 99 F | WEIGHT: 201.19 LBS | DIASTOLIC BLOOD PRESSURE: 70 MMHG | HEIGHT: 69 IN

## 2018-03-25 DIAGNOSIS — M79.645 PAIN OF LEFT THUMB: ICD-10-CM
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
## 2018-03-25 DIAGNOSIS — S61.012A LACERATION OF LEFT THUMB WITHOUT FOREIGN BODY WITHOUT DAMAGE TO NAIL, INITIAL ENCOUNTER: Primary | ICD-10-CM

## 2018-03-25 PROCEDURE — 99283 EMERGENCY DEPT VISIT LOW MDM: CPT

## 2018-03-25 RX ORDER — MUPIROCIN 20 MG/G
OINTMENT TOPICAL 3 TIMES DAILY
Qty: 1 TUBE | Refills: 0 | Status: SHIPPED | OUTPATIENT
Start: 2018-03-25 | End: 2018-04-04

## 2018-03-26 ENCOUNTER — PES CALL (OUTPATIENT)
Dept: ADMINISTRATIVE | Facility: CLINIC | Age: 58
End: 2018-03-26

## 2018-03-26 NOTE — ED PROVIDER NOTES
SCRIBE #1 NOTE: I, Joan Preston, am scribing for, and in the presence of, Raghavendra Conde NP. I have scribed the entire note.      History      Chief Complaint   Patient presents with    Laceration     to L thumb from knife       Review of patient's allergies indicates:   Allergen Reactions    Erythromycin     Ibuprofen     Lisinopril Other (See Comments)     uncontrollable cough    Penicillins     Tetracyclines         HPI   HPI    3/25/2018, 9:23 PM   History obtained from the patient      History of Present Illness: Lynn Turner is a 57 y.o. female patient who presents to the Emergency Department for lacerationwhich onset suddenly 3 hours ago. Symptoms are constant and mild in severity. Laceration is located to L thumb. Pt reports that she was cooking when she accidentally cut her thumb with a knife. No mitigating or exacerbating factors reported. No associated sxs reported. Patient denies any numbness, other injury, extremity weakness, swelling, and all other  sxs at this time. No further complaints or concerns at this time.     Arrival mode: Personal vehicle    PCP: Atilio Howard MD       Past Medical History:  Past Medical History:   Diagnosis Date    Aortic insufficiency     Bicuspid aortic valve     Hypertension     diet controlled    Palpitations     SVT (supraventricular tachycardia)     Tobacco use        Past Surgical History:  Past Surgical History:   Procedure Laterality Date    BREAST SURGERY      CARDIAC ELECTROPHYSIOLOGY STUDY AND ABLATION  2013    COLONOSCOPY N/A 1/14/2016    Procedure: COLONOSCOPY;  Surgeon: Sushant Gutierrez MD;  Location: [facility];  Service: Endoscopy;  Laterality: N/A;    HYSTERECTOMY  1995    partial    KNEE ARTHROSCOPY Right          Family History:  Family History   Problem Relation Age of Onset    Cataracts Mother     Stroke Mother     Diabetes Mother     Hypertension Mother     Hyperlipidemia Mother     Macular degeneration Father     Cataracts  Father     Heart disease Father     Coronary artery disease Father     Diabetes Father     Hypertension Father        Social History:  Social History     Social History Main Topics    Smoking status: Former Smoker     Packs/day: 1.00     Years: 40.00     Quit date: 8/16/2017    Smokeless tobacco: Never Used    Alcohol use Yes      Comment: occasional    Drug use: No    Sexual activity: Yes     Partners: Male       ROS   Review of Systems   Constitutional: Negative for fatigue and fever.   HENT: Negative for congestion and rhinorrhea.    Eyes: Negative for photophobia.   Respiratory: Negative for cough and shortness of breath.    Cardiovascular: Negative for chest pain and leg swelling.   Gastrointestinal: Negative for abdominal pain, diarrhea, nausea and vomiting.   Endocrine: Negative for heat intolerance.   Genitourinary: Negative for dysuria.   Musculoskeletal: Negative for back pain and myalgias.   Skin: Positive for wound (laceration L thumb). Negative for rash.   Allergic/Immunologic: Negative.    Neurological: Negative for weakness.   Psychiatric/Behavioral: Negative.        Physical Exam      Initial Vitals [03/25/18 2044]   BP Pulse Resp Temp SpO2   (!) 152/62 (!) 57 18 98.5 °F (36.9 °C) 97 %      MAP       92          Physical Exam  Nursing Notes and Vital Signs Reviewed.  Vital signs and nursing notes reviewed.  Constitutional: Patient is in no acute distress.  Head: Atraumatic and normocephalic.  Eyes: Normal sclera.  ENT: Moist mucosa.  Cardiovascular: Well perfused.  Pulmonary/Chest: No respiratory distress.  Musculoskeletal: Moves all extremities.  Skin: Dry and nl in appearance. 0.5 cm superficial laceration to left thumb.  Neurological: Awake and alert.  Psychological: Nl affect.      ED Course    Lac Repair  Date/Time: 3/25/2018 9:59 PM  Performed by: MELISSA HORTA  Authorized by: MELISSA HORTA   Consent Done: Not Needed  Body area: upper extremity  Location details: right  "thumb  Laceration length: 0.5 cm  Foreign bodies: no foreign bodies  Tendon involvement: none  Nerve involvement: none  Vascular damage: no  Patient sedated: no  Skin closure: Steri-Strips  Technique: simple  Patient tolerance: Patient tolerated the procedure well with no immediate complications        ED Vital Signs:  Vitals:    03/25/18 2044 03/25/18 2149   BP: (!) 152/62 (!) 148/70   Pulse: (!) 57 60   Resp: 18 18   Temp: 98.5 °F (36.9 °C) 98.5 °F (36.9 °C)   TempSrc: Oral Oral   SpO2: 97% 98%   Weight: 91.3 kg (201 lb 2.7 oz)    Height: 5' 9" (1.753 m)        Abnormal Lab Results:  Labs Reviewed - No data to display     All Lab Results:  None     Imaging Results:  Imaging Results    None              The Emergency Provider reviewed the vital signs and test results, which are outlined above.    ED Discussion       9:50 PM: Reassessed pt at this time. Pt is in no distress. Discussed with pt all pertinent ED information and results. Discussed pt diagnosis and plan of treatment. Gave pt all f/u and return to the ED instructions. All questions and concerns were addressed at this time. Pt expresses understanding of information and instructions, and is comfortable with plan to discharge. Pt is stable for discharge.    I discussed wound care precautions with patient and/or family/caretaker; specifically that all wounds have risk of infection despite efforts to cleanse and debride the wound; and there is a risk of an occult foreign body (and thus increased risk of infection) despite a negative examination.  I discussed with patient need to return for any signs of infection, specifically redness, increased pain, fever, drainage of pus, or any concern, immediately.        ED Medication(s):  Medications - No data to display    Discharge Medication List as of 3/25/2018  9:48 PM      START taking these medications    Details   mupirocin (BACTROBAN) 2 % ointment Apply topically 3 (three) times daily., Starting Sun 3/25/2018, " Until Wed 4/4/2018, Print             Follow-up Information     Atilio Howard MD. Schedule an appointment as soon as possible for a visit in 2 days.    Specialty:  Internal Medicine  Why:  For wound re-check  Contact information:  [address]  [address] [place] [address] [phone]             [facility]    Specialty:  Emergency Medicine  Why:  As needed, If symptoms worsen  Contact information:  [address]  [facility] [address] [phone]                   Medical Decision Making              Scribe Attestation:   Scribe #1: I performed the above scribed service and the documentation accurately describes the services I performed. I attest to the accuracy of the note.    Attending:   Physician Attestation Statement for Scribe #1: I, Raghavendra Conde NP, personally performed the services described in this documentation, as scribed by Joan Preston, in my presence, and it is both accurate and complete.          Clinical Impression       ICD-10-CM ICD-9-CM   1. Laceration of left thumb without foreign body without damage to nail, initial encounter S61.012A 883.0   2. Pain of left thumb M79.645 729.5   3. Knife wound W26.0XXA 959.9     E920.3       Disposition:   Disposition: Discharged  Condition: Stable           Raghavendra Conde NP  03/26/18 0027

## 2018-04-09 ENCOUNTER — OFFICE VISIT (OUTPATIENT)
Dept: GASTROENTEROLOGY | Facility: CLINIC | Age: 58
End: 2018-04-09
Payer: COMMERCIAL

## 2018-04-09 VITALS
SYSTOLIC BLOOD PRESSURE: 150 MMHG | BODY MASS INDEX: 29.68 KG/M2 | DIASTOLIC BLOOD PRESSURE: 82 MMHG | HEART RATE: 60 BPM | WEIGHT: 200.38 LBS | HEIGHT: 69 IN

## 2018-04-09 DIAGNOSIS — K29.70 GASTRITIS, PRESENCE OF BLEEDING UNSPECIFIED, UNSPECIFIED CHRONICITY, UNSPECIFIED GASTRITIS TYPE: ICD-10-CM

## 2018-04-09 DIAGNOSIS — R10.13 EPIGASTRIC ABDOMINAL PAIN: Primary | ICD-10-CM

## 2018-04-09 PROCEDURE — 3079F DIAST BP 80-89 MM HG: CPT | Mod: CPTII,S$GLB,, | Performed by: NURSE PRACTITIONER

## 2018-04-09 PROCEDURE — 3077F SYST BP >= 140 MM HG: CPT | Mod: CPTII,S$GLB,, | Performed by: NURSE PRACTITIONER

## 2018-04-09 PROCEDURE — 99214 OFFICE O/P EST MOD 30 MIN: CPT | Mod: S$GLB,,, | Performed by: NURSE PRACTITIONER

## 2018-04-09 PROCEDURE — 99999 PR PBB SHADOW E&M-EST. PATIENT-LVL II: CPT | Mod: PBBFAC,,, | Performed by: NURSE PRACTITIONER

## 2018-04-09 NOTE — PROGRESS NOTES
Clinic Follow Up:  [facility] Follow Up Note    Reason for Follow Up:  The primary encounter diagnosis was Epigastric abdominal pain. A diagnosis of Gastritis, presence of bleeding unspecified, unspecified chronicity, unspecified gastritis type was also pertinent to this visit.    PCP: Atilio Howard       HPI:  This is a 57 y.o. female here for follow up of the above  Pt states that since her last visit, she has been feeling overall well.  She has had no further episodes of abdominal pain  Recent EGD showed mild gastritis on pathology, no infection.   Denies any abdominal pain.  No nausea or vomiting.  No change in bowel pattern.  No melena or hematochezia. No weight loss.      Review of Systems   Constitutional: Negative for chills, fever, malaise/fatigue and weight loss.   Respiratory: Negative for cough.    Cardiovascular: Negative for chest pain.   Gastrointestinal:        Per HPI   Musculoskeletal: Negative for myalgias.   Skin: Negative for itching and rash.   Neurological: Negative for headaches.   Psychiatric/Behavioral: The patient is not nervous/anxious.        Medical History:  Past Medical History:   Diagnosis Date    Aortic insufficiency     Bicuspid aortic valve     Hypertension     diet controlled    Palpitations     SVT (supraventricular tachycardia)     Tobacco use        Surgical History:   Past Surgical History:   Procedure Laterality Date    BREAST SURGERY      CARDIAC ELECTROPHYSIOLOGY STUDY AND ABLATION  2013    COLONOSCOPY N/A 1/14/2016    Procedure: COLONOSCOPY;  Surgeon: Sushant Gutierrez MD;  Location: [facility];  Service: Endoscopy;  Laterality: N/A;    HYSTERECTOMY  1995    partial    KNEE ARTHROSCOPY Right        Family History:   Family History   Problem Relation Age of Onset    Cataracts Mother     Stroke Mother     Diabetes Mother     Hypertension Mother     Hyperlipidemia Mother     Macular degeneration Father     Cataracts Father     Heart  "disease Father     Coronary artery disease Father     Diabetes Father     Hypertension Father        Social History:   Social History   Substance Use Topics    Smoking status: Current Some Day Smoker     Packs/day: 1.00     Years: 40.00     Last attempt to quit: 8/16/2017    Smokeless tobacco: Never Used    Alcohol use Yes      Comment: occasional       Allergies: Reviewed    Home Medications:  Current Outpatient Prescriptions on File Prior to Visit   Medication Sig Dispense Refill    aspirin 325 MG tablet Take 325 mg by mouth once daily.      calcium-vitamin D (OSCAL) 250 (625)-125 mg-unit per tablet Take 1 tablet by mouth once daily.      losartan (COZAAR) 50 MG tablet Take 1 tablet (50 mg total) by mouth once daily. 90 tablet 3    metoprolol succinate (TOPROL-XL) 25 MG 24 hr tablet TK 1 T PO QD  12    omeprazole (PRILOSEC) 40 MG capsule Take 1 capsule (40 mg total) by mouth once daily. 30 capsule 11    rosuvastatin (CRESTOR) 5 MG tablet Take 1 tablet (5 mg total) by mouth once daily. 90 tablet 3    VIT C/E/ZN/COPPR/LUTEIN/ZEAXAN (PRESERVISION AREDS 2 ORAL) Take by mouth.       No current facility-administered medications on file prior to visit.        Physical Exam:  Vital Signs:  BP (!) 150/82   Pulse 60   Ht 5' 9" (1.753 m)   Wt 90.9 kg (200 lb 6.4 oz)   BMI 29.59 kg/m²   Body mass index is 29.59 kg/m².  Physical Exam   Constitutional: She is oriented to person, place, and time. She appears well-developed and well-nourished.   HENT:   Head: Normocephalic.   Eyes: No scleral icterus.   Neck: Normal range of motion.   Cardiovascular: Normal rate and regular rhythm.    Pulmonary/Chest: Effort normal and breath sounds normal.   Abdominal: Soft. Bowel sounds are normal. She exhibits no distension. There is no tenderness.   Musculoskeletal: Normal range of motion.   Neurological: She is alert and oriented to person, place, and time.   Skin: Skin is warm and dry.   Psychiatric: She has a normal mood " and affect.   Vitals reviewed.      Labs: Pertinent labs reviewed.      Assessment:  1. Epigastric abdominal pain    2. Gastritis, presence of bleeding unspecified, unspecified chronicity, unspecified gastritis type        Recommendations:  - Continue PPI daily for a full 12 weeks, then can stop.  If symptoms return, can resume medication.    - If symptoms continue, will plan for HIDA.  - GERD diet and lifestyle discussed    Epigastric abdominal pain    Gastritis, presence of bleeding unspecified, unspecified chronicity, unspecified gastritis type          Return to Clinic:    Follow-up if symptoms worsen or fail to improve.

## 2018-09-06 ENCOUNTER — PATIENT MESSAGE (OUTPATIENT)
Dept: INTERNAL MEDICINE | Facility: CLINIC | Age: 58
End: 2018-09-06

## 2018-09-06 DIAGNOSIS — I10 ESSENTIAL HYPERTENSION: ICD-10-CM

## 2018-09-06 RX ORDER — LOSARTAN POTASSIUM 50 MG/1
50 TABLET ORAL DAILY
Qty: 90 TABLET | Refills: 0 | Status: SHIPPED | OUTPATIENT
Start: 2018-09-06 | End: 2018-12-02 | Stop reason: SDUPTHER

## 2018-09-26 PROBLEM — I65.23 BILATERAL CAROTID ARTERY STENOSIS: Status: ACTIVE | Noted: 2018-09-26

## 2018-09-26 PROBLEM — E78.5 HYPERLIPIDEMIA: Status: ACTIVE | Noted: 2018-09-26

## 2018-10-19 RX ORDER — ROSUVASTATIN CALCIUM 5 MG/1
5 TABLET, COATED ORAL DAILY
Qty: 90 TABLET | Refills: 3 | Status: SHIPPED | OUTPATIENT
Start: 2018-10-19 | End: 2019-10-25 | Stop reason: SDUPTHER

## 2018-12-02 DIAGNOSIS — I10 ESSENTIAL HYPERTENSION: ICD-10-CM

## 2018-12-03 RX ORDER — LOSARTAN POTASSIUM 50 MG/1
TABLET ORAL
Qty: 90 TABLET | Refills: 0 | Status: SHIPPED | OUTPATIENT
Start: 2018-12-03 | End: 2019-03-14 | Stop reason: SDUPTHER

## 2019-03-02 DIAGNOSIS — I10 ESSENTIAL HYPERTENSION: ICD-10-CM

## 2019-03-03 RX ORDER — LOSARTAN POTASSIUM 50 MG/1
TABLET ORAL
Qty: 90 TABLET | Refills: 0 | OUTPATIENT
Start: 2019-03-03

## 2020-09-08 PROBLEM — F17.210 CIGARETTE NICOTINE DEPENDENCE WITHOUT COMPLICATION: Status: ACTIVE | Noted: 2020-09-08

## 2021-06-28 ENCOUNTER — OFFICE VISIT (OUTPATIENT)
Dept: OTOLARYNGOLOGY | Facility: CLINIC | Age: 61
End: 2021-06-28
Payer: COMMERCIAL

## 2021-06-28 VITALS
DIASTOLIC BLOOD PRESSURE: 76 MMHG | TEMPERATURE: 99 F | BODY MASS INDEX: 28.99 KG/M2 | HEART RATE: 61 BPM | WEIGHT: 195.75 LBS | HEIGHT: 69 IN | SYSTOLIC BLOOD PRESSURE: 158 MMHG

## 2021-06-28 DIAGNOSIS — G43.109 VERTIGINOUS MIGRAINE: Primary | ICD-10-CM

## 2021-06-28 PROCEDURE — 1126F PR PAIN SEVERITY QUANTIFIED, NO PAIN PRESENT: ICD-10-PCS | Mod: S$GLB,,, | Performed by: NURSE PRACTITIONER

## 2021-06-28 PROCEDURE — 99203 PR OFFICE/OUTPT VISIT, NEW, LEVL III, 30-44 MIN: ICD-10-PCS | Mod: S$GLB,,, | Performed by: NURSE PRACTITIONER

## 2021-06-28 PROCEDURE — 99999 PR PBB SHADOW E&M-EST. PATIENT-LVL V: CPT | Mod: PBBFAC,,, | Performed by: NURSE PRACTITIONER

## 2021-06-28 PROCEDURE — 1126F AMNT PAIN NOTED NONE PRSNT: CPT | Mod: S$GLB,,, | Performed by: NURSE PRACTITIONER

## 2021-06-28 PROCEDURE — 99999 PR PBB SHADOW E&M-EST. PATIENT-LVL V: ICD-10-PCS | Mod: PBBFAC,,, | Performed by: NURSE PRACTITIONER

## 2021-06-28 PROCEDURE — 3008F PR BODY MASS INDEX (BMI) DOCUMENTED: ICD-10-PCS | Mod: CPTII,S$GLB,, | Performed by: NURSE PRACTITIONER

## 2021-06-28 PROCEDURE — 99203 OFFICE O/P NEW LOW 30 MIN: CPT | Mod: S$GLB,,, | Performed by: NURSE PRACTITIONER

## 2021-06-28 PROCEDURE — 3008F BODY MASS INDEX DOCD: CPT | Mod: CPTII,S$GLB,, | Performed by: NURSE PRACTITIONER

## 2021-06-28 RX ORDER — LANOLIN ALCOHOL/MO/W.PET/CERES
400 CREAM (GRAM) TOPICAL 2 TIMES DAILY
Qty: 60 TABLET | Refills: 11 | Status: SHIPPED | OUTPATIENT
Start: 2021-06-28 | End: 2021-10-13

## 2021-07-06 ENCOUNTER — OFFICE VISIT (OUTPATIENT)
Dept: NEUROLOGY | Facility: CLINIC | Age: 61
End: 2021-07-06
Payer: COMMERCIAL

## 2021-07-06 VITALS
DIASTOLIC BLOOD PRESSURE: 78 MMHG | HEART RATE: 55 BPM | BODY MASS INDEX: 28.9 KG/M2 | WEIGHT: 195.13 LBS | TEMPERATURE: 99 F | RESPIRATION RATE: 17 BRPM | SYSTOLIC BLOOD PRESSURE: 149 MMHG | HEIGHT: 69 IN

## 2021-07-06 DIAGNOSIS — R26.89 BALANCE PROBLEM: ICD-10-CM

## 2021-07-06 DIAGNOSIS — G43.109 VERTIGINOUS MIGRAINE: ICD-10-CM

## 2021-07-06 DIAGNOSIS — M79.18 MYOFASCIAL PAIN: ICD-10-CM

## 2021-07-06 DIAGNOSIS — H93.13 TINNITUS OF BOTH EARS: ICD-10-CM

## 2021-07-06 DIAGNOSIS — G47.9 TROUBLE IN SLEEPING: ICD-10-CM

## 2021-07-06 DIAGNOSIS — R45.86 MOOD CHANGE: ICD-10-CM

## 2021-07-06 DIAGNOSIS — R51.9 WORSENING HEADACHES: Primary | ICD-10-CM

## 2021-07-06 DIAGNOSIS — R03.0 ELEVATED BLOOD PRESSURE READING: ICD-10-CM

## 2021-07-06 PROCEDURE — 1126F AMNT PAIN NOTED NONE PRSNT: CPT | Mod: S$GLB,,, | Performed by: PHYSICIAN ASSISTANT

## 2021-07-06 PROCEDURE — 99999 PR PBB SHADOW E&M-EST. PATIENT-LVL V: ICD-10-PCS | Mod: PBBFAC,,, | Performed by: PHYSICIAN ASSISTANT

## 2021-07-06 PROCEDURE — 3008F BODY MASS INDEX DOCD: CPT | Mod: CPTII,S$GLB,, | Performed by: PHYSICIAN ASSISTANT

## 2021-07-06 PROCEDURE — 3008F PR BODY MASS INDEX (BMI) DOCUMENTED: ICD-10-PCS | Mod: CPTII,S$GLB,, | Performed by: PHYSICIAN ASSISTANT

## 2021-07-06 PROCEDURE — 99204 PR OFFICE/OUTPT VISIT, NEW, LEVL IV, 45-59 MIN: ICD-10-PCS | Mod: S$GLB,,, | Performed by: PHYSICIAN ASSISTANT

## 2021-07-06 PROCEDURE — 99999 PR PBB SHADOW E&M-EST. PATIENT-LVL V: CPT | Mod: PBBFAC,,, | Performed by: PHYSICIAN ASSISTANT

## 2021-07-06 PROCEDURE — 1126F PR PAIN SEVERITY QUANTIFIED, NO PAIN PRESENT: ICD-10-PCS | Mod: S$GLB,,, | Performed by: PHYSICIAN ASSISTANT

## 2021-07-06 PROCEDURE — 99204 OFFICE O/P NEW MOD 45 MIN: CPT | Mod: S$GLB,,, | Performed by: PHYSICIAN ASSISTANT

## 2021-07-06 RX ORDER — TRAZODONE HYDROCHLORIDE 50 MG/1
TABLET ORAL
Qty: 30 TABLET | Refills: 8 | Status: SHIPPED | OUTPATIENT
Start: 2021-07-06 | End: 2021-08-09 | Stop reason: SDUPTHER

## 2021-07-09 ENCOUNTER — PATIENT MESSAGE (OUTPATIENT)
Dept: NEUROLOGY | Facility: CLINIC | Age: 61
End: 2021-07-09

## 2021-07-14 ENCOUNTER — CLINICAL SUPPORT (OUTPATIENT)
Dept: REHABILITATION | Facility: HOSPITAL | Age: 61
End: 2021-07-14
Attending: PHYSICIAN ASSISTANT
Payer: COMMERCIAL

## 2021-07-14 DIAGNOSIS — M79.18 MYOFASCIAL PAIN: ICD-10-CM

## 2021-07-14 DIAGNOSIS — R26.89 BALANCE DISORDER: ICD-10-CM

## 2021-07-14 DIAGNOSIS — R26.89 BALANCE PROBLEM: ICD-10-CM

## 2021-07-14 DIAGNOSIS — M54.2 NECK PAIN: ICD-10-CM

## 2021-07-14 DIAGNOSIS — R26.2 DIFFICULTY WALKING: ICD-10-CM

## 2021-07-14 DIAGNOSIS — G43.109 VERTIGINOUS MIGRAINE: ICD-10-CM

## 2021-07-14 PROCEDURE — 97161 PT EVAL LOW COMPLEX 20 MIN: CPT | Mod: PO | Performed by: PHYSICAL THERAPIST

## 2021-07-14 PROCEDURE — 97112 NEUROMUSCULAR REEDUCATION: CPT | Mod: PO | Performed by: PHYSICAL THERAPIST

## 2021-07-21 ENCOUNTER — CLINICAL SUPPORT (OUTPATIENT)
Dept: REHABILITATION | Facility: HOSPITAL | Age: 61
End: 2021-07-21
Attending: PHYSICIAN ASSISTANT
Payer: COMMERCIAL

## 2021-07-21 DIAGNOSIS — R26.89 BALANCE DISORDER: ICD-10-CM

## 2021-07-21 DIAGNOSIS — M54.2 NECK PAIN: ICD-10-CM

## 2021-07-21 DIAGNOSIS — R26.2 DIFFICULTY WALKING: ICD-10-CM

## 2021-07-21 PROCEDURE — 97112 NEUROMUSCULAR REEDUCATION: CPT | Mod: PO | Performed by: PHYSICAL THERAPIST

## 2021-07-30 ENCOUNTER — CLINICAL SUPPORT (OUTPATIENT)
Dept: REHABILITATION | Facility: HOSPITAL | Age: 61
End: 2021-07-30
Attending: PHYSICIAN ASSISTANT
Payer: COMMERCIAL

## 2021-07-30 DIAGNOSIS — M54.2 NECK PAIN: ICD-10-CM

## 2021-07-30 DIAGNOSIS — R26.89 BALANCE DISORDER: ICD-10-CM

## 2021-07-30 DIAGNOSIS — R26.2 DIFFICULTY WALKING: ICD-10-CM

## 2021-07-30 PROCEDURE — 97112 NEUROMUSCULAR REEDUCATION: CPT | Mod: PO | Performed by: PHYSICAL THERAPIST

## 2021-08-03 ENCOUNTER — CLINICAL SUPPORT (OUTPATIENT)
Dept: REHABILITATION | Facility: HOSPITAL | Age: 61
End: 2021-08-03
Attending: PHYSICIAN ASSISTANT
Payer: COMMERCIAL

## 2021-08-03 DIAGNOSIS — R26.2 DIFFICULTY WALKING: ICD-10-CM

## 2021-08-03 DIAGNOSIS — R26.89 BALANCE DISORDER: ICD-10-CM

## 2021-08-03 DIAGNOSIS — M54.2 NECK PAIN: ICD-10-CM

## 2021-08-03 PROCEDURE — 97140 MANUAL THERAPY 1/> REGIONS: CPT | Mod: PO | Performed by: PHYSICAL THERAPIST

## 2021-08-03 PROCEDURE — 97112 NEUROMUSCULAR REEDUCATION: CPT | Mod: PO | Performed by: PHYSICAL THERAPIST

## 2021-08-05 ENCOUNTER — CLINICAL SUPPORT (OUTPATIENT)
Dept: REHABILITATION | Facility: HOSPITAL | Age: 61
End: 2021-08-05
Attending: PHYSICIAN ASSISTANT
Payer: COMMERCIAL

## 2021-08-05 DIAGNOSIS — M54.2 NECK PAIN: ICD-10-CM

## 2021-08-05 DIAGNOSIS — R26.2 DIFFICULTY WALKING: ICD-10-CM

## 2021-08-05 DIAGNOSIS — R26.89 BALANCE DISORDER: ICD-10-CM

## 2021-08-05 PROCEDURE — 97112 NEUROMUSCULAR REEDUCATION: CPT | Mod: PO | Performed by: PHYSICAL THERAPIST

## 2021-08-05 PROCEDURE — 97140 MANUAL THERAPY 1/> REGIONS: CPT | Mod: PO | Performed by: PHYSICAL THERAPIST

## 2021-08-09 ENCOUNTER — OFFICE VISIT (OUTPATIENT)
Dept: NEUROLOGY | Facility: CLINIC | Age: 61
End: 2021-08-09
Payer: COMMERCIAL

## 2021-08-09 VITALS
HEIGHT: 69 IN | DIASTOLIC BLOOD PRESSURE: 78 MMHG | SYSTOLIC BLOOD PRESSURE: 150 MMHG | RESPIRATION RATE: 17 BRPM | BODY MASS INDEX: 28.8 KG/M2 | WEIGHT: 194.44 LBS | HEART RATE: 59 BPM

## 2021-08-09 DIAGNOSIS — M79.18 MYOFASCIAL PAIN: ICD-10-CM

## 2021-08-09 DIAGNOSIS — G43.109 VERTIGINOUS MIGRAINE: Primary | ICD-10-CM

## 2021-08-09 DIAGNOSIS — R03.0 ELEVATED BLOOD PRESSURE READING: ICD-10-CM

## 2021-08-09 PROCEDURE — 3008F PR BODY MASS INDEX (BMI) DOCUMENTED: ICD-10-PCS | Mod: CPTII,S$GLB,, | Performed by: PHYSICIAN ASSISTANT

## 2021-08-09 PROCEDURE — 3008F BODY MASS INDEX DOCD: CPT | Mod: CPTII,S$GLB,, | Performed by: PHYSICIAN ASSISTANT

## 2021-08-09 PROCEDURE — 99999 PR PBB SHADOW E&M-EST. PATIENT-LVL III: ICD-10-PCS | Mod: PBBFAC,,, | Performed by: PHYSICIAN ASSISTANT

## 2021-08-09 PROCEDURE — 3078F DIAST BP <80 MM HG: CPT | Mod: CPTII,S$GLB,, | Performed by: PHYSICIAN ASSISTANT

## 2021-08-09 PROCEDURE — 99214 OFFICE O/P EST MOD 30 MIN: CPT | Mod: S$GLB,,, | Performed by: PHYSICIAN ASSISTANT

## 2021-08-09 PROCEDURE — 99999 PR PBB SHADOW E&M-EST. PATIENT-LVL III: CPT | Mod: PBBFAC,,, | Performed by: PHYSICIAN ASSISTANT

## 2021-08-09 PROCEDURE — 99214 PR OFFICE/OUTPT VISIT, EST, LEVL IV, 30-39 MIN: ICD-10-PCS | Mod: S$GLB,,, | Performed by: PHYSICIAN ASSISTANT

## 2021-08-09 PROCEDURE — 1160F PR REVIEW ALL MEDS BY PRESCRIBER/CLIN PHARMACIST DOCUMENTED: ICD-10-PCS | Mod: CPTII,S$GLB,, | Performed by: PHYSICIAN ASSISTANT

## 2021-08-09 PROCEDURE — 1126F AMNT PAIN NOTED NONE PRSNT: CPT | Mod: CPTII,S$GLB,, | Performed by: PHYSICIAN ASSISTANT

## 2021-08-09 PROCEDURE — 1160F RVW MEDS BY RX/DR IN RCRD: CPT | Mod: CPTII,S$GLB,, | Performed by: PHYSICIAN ASSISTANT

## 2021-08-09 PROCEDURE — 1126F PR PAIN SEVERITY QUANTIFIED, NO PAIN PRESENT: ICD-10-PCS | Mod: CPTII,S$GLB,, | Performed by: PHYSICIAN ASSISTANT

## 2021-08-09 PROCEDURE — 3078F PR MOST RECENT DIASTOLIC BLOOD PRESSURE < 80 MM HG: ICD-10-PCS | Mod: CPTII,S$GLB,, | Performed by: PHYSICIAN ASSISTANT

## 2021-08-09 PROCEDURE — 1159F MED LIST DOCD IN RCRD: CPT | Mod: CPTII,S$GLB,, | Performed by: PHYSICIAN ASSISTANT

## 2021-08-09 PROCEDURE — 3077F PR MOST RECENT SYSTOLIC BLOOD PRESSURE >= 140 MM HG: ICD-10-PCS | Mod: CPTII,S$GLB,, | Performed by: PHYSICIAN ASSISTANT

## 2021-08-09 PROCEDURE — 1159F PR MEDICATION LIST DOCUMENTED IN MEDICAL RECORD: ICD-10-PCS | Mod: CPTII,S$GLB,, | Performed by: PHYSICIAN ASSISTANT

## 2021-08-09 PROCEDURE — 3077F SYST BP >= 140 MM HG: CPT | Mod: CPTII,S$GLB,, | Performed by: PHYSICIAN ASSISTANT

## 2021-08-09 RX ORDER — TRAZODONE HYDROCHLORIDE 50 MG/1
TABLET ORAL
Qty: 90 TABLET | Refills: 1 | Status: SHIPPED | OUTPATIENT
Start: 2021-08-09 | End: 2021-10-11 | Stop reason: SDUPTHER

## 2021-08-09 RX ORDER — TRAZODONE HYDROCHLORIDE 50 MG/1
TABLET ORAL
Qty: 30 TABLET | Refills: 8 | Status: SHIPPED | OUTPATIENT
Start: 2021-08-09 | End: 2021-08-09 | Stop reason: CLARIF

## 2021-08-10 ENCOUNTER — CLINICAL SUPPORT (OUTPATIENT)
Dept: REHABILITATION | Facility: HOSPITAL | Age: 61
End: 2021-08-10
Attending: PHYSICIAN ASSISTANT
Payer: COMMERCIAL

## 2021-08-10 DIAGNOSIS — M54.2 NECK PAIN: ICD-10-CM

## 2021-08-10 DIAGNOSIS — R26.89 BALANCE DISORDER: ICD-10-CM

## 2021-08-10 DIAGNOSIS — R26.2 DIFFICULTY WALKING: ICD-10-CM

## 2021-08-10 PROCEDURE — 97112 NEUROMUSCULAR REEDUCATION: CPT | Mod: PO | Performed by: PHYSICAL THERAPIST

## 2021-08-12 ENCOUNTER — CLINICAL SUPPORT (OUTPATIENT)
Dept: REHABILITATION | Facility: HOSPITAL | Age: 61
End: 2021-08-12
Attending: PHYSICIAN ASSISTANT
Payer: COMMERCIAL

## 2021-08-12 DIAGNOSIS — M54.2 NECK PAIN: ICD-10-CM

## 2021-08-12 DIAGNOSIS — R26.2 DIFFICULTY WALKING: ICD-10-CM

## 2021-08-12 DIAGNOSIS — R26.89 BALANCE DISORDER: ICD-10-CM

## 2021-08-12 PROCEDURE — 97112 NEUROMUSCULAR REEDUCATION: CPT | Mod: PO | Performed by: PHYSICAL THERAPIST

## 2021-08-17 ENCOUNTER — CLINICAL SUPPORT (OUTPATIENT)
Dept: REHABILITATION | Facility: HOSPITAL | Age: 61
End: 2021-08-17
Attending: PHYSICIAN ASSISTANT
Payer: COMMERCIAL

## 2021-08-17 DIAGNOSIS — R26.2 DIFFICULTY WALKING: ICD-10-CM

## 2021-08-17 DIAGNOSIS — M54.2 NECK PAIN: ICD-10-CM

## 2021-08-17 DIAGNOSIS — R26.89 BALANCE DISORDER: ICD-10-CM

## 2021-08-17 PROCEDURE — 97112 NEUROMUSCULAR REEDUCATION: CPT | Mod: PO | Performed by: PHYSICAL THERAPIST

## 2021-08-19 ENCOUNTER — CLINICAL SUPPORT (OUTPATIENT)
Dept: REHABILITATION | Facility: HOSPITAL | Age: 61
End: 2021-08-19
Attending: PHYSICIAN ASSISTANT
Payer: COMMERCIAL

## 2021-08-19 DIAGNOSIS — R26.89 BALANCE DISORDER: ICD-10-CM

## 2021-08-19 DIAGNOSIS — M54.2 NECK PAIN: ICD-10-CM

## 2021-08-19 DIAGNOSIS — R26.2 DIFFICULTY WALKING: ICD-10-CM

## 2021-08-19 PROCEDURE — 97112 NEUROMUSCULAR REEDUCATION: CPT | Mod: PO | Performed by: PHYSICAL THERAPIST

## 2021-10-11 ENCOUNTER — OFFICE VISIT (OUTPATIENT)
Dept: NEUROLOGY | Facility: CLINIC | Age: 61
End: 2021-10-11
Payer: COMMERCIAL

## 2021-10-11 VITALS
SYSTOLIC BLOOD PRESSURE: 140 MMHG | HEIGHT: 69 IN | RESPIRATION RATE: 17 BRPM | BODY MASS INDEX: 28.99 KG/M2 | HEART RATE: 59 BPM | WEIGHT: 195.75 LBS | TEMPERATURE: 98 F | DIASTOLIC BLOOD PRESSURE: 75 MMHG

## 2021-10-11 DIAGNOSIS — G43.109 VERTIGINOUS MIGRAINE: Primary | ICD-10-CM

## 2021-10-11 PROCEDURE — 1160F PR REVIEW ALL MEDS BY PRESCRIBER/CLIN PHARMACIST DOCUMENTED: ICD-10-PCS | Mod: CPTII,S$GLB,, | Performed by: PHYSICIAN ASSISTANT

## 2021-10-11 PROCEDURE — 1160F RVW MEDS BY RX/DR IN RCRD: CPT | Mod: CPTII,S$GLB,, | Performed by: PHYSICIAN ASSISTANT

## 2021-10-11 PROCEDURE — 99999 PR PBB SHADOW E&M-EST. PATIENT-LVL III: CPT | Mod: PBBFAC,,, | Performed by: PHYSICIAN ASSISTANT

## 2021-10-11 PROCEDURE — 99214 PR OFFICE/OUTPT VISIT, EST, LEVL IV, 30-39 MIN: ICD-10-PCS | Mod: S$GLB,,, | Performed by: PHYSICIAN ASSISTANT

## 2021-10-11 PROCEDURE — 3078F DIAST BP <80 MM HG: CPT | Mod: CPTII,S$GLB,, | Performed by: PHYSICIAN ASSISTANT

## 2021-10-11 PROCEDURE — 3078F PR MOST RECENT DIASTOLIC BLOOD PRESSURE < 80 MM HG: ICD-10-PCS | Mod: CPTII,S$GLB,, | Performed by: PHYSICIAN ASSISTANT

## 2021-10-11 PROCEDURE — 1159F MED LIST DOCD IN RCRD: CPT | Mod: CPTII,S$GLB,, | Performed by: PHYSICIAN ASSISTANT

## 2021-10-11 PROCEDURE — 3077F PR MOST RECENT SYSTOLIC BLOOD PRESSURE >= 140 MM HG: ICD-10-PCS | Mod: CPTII,S$GLB,, | Performed by: PHYSICIAN ASSISTANT

## 2021-10-11 PROCEDURE — 3008F PR BODY MASS INDEX (BMI) DOCUMENTED: ICD-10-PCS | Mod: CPTII,S$GLB,, | Performed by: PHYSICIAN ASSISTANT

## 2021-10-11 PROCEDURE — 3008F BODY MASS INDEX DOCD: CPT | Mod: CPTII,S$GLB,, | Performed by: PHYSICIAN ASSISTANT

## 2021-10-11 PROCEDURE — 4010F PR ACE/ARB THEARPY RXD/TAKEN: ICD-10-PCS | Mod: CPTII,S$GLB,, | Performed by: PHYSICIAN ASSISTANT

## 2021-10-11 PROCEDURE — 1159F PR MEDICATION LIST DOCUMENTED IN MEDICAL RECORD: ICD-10-PCS | Mod: CPTII,S$GLB,, | Performed by: PHYSICIAN ASSISTANT

## 2021-10-11 PROCEDURE — 99214 OFFICE O/P EST MOD 30 MIN: CPT | Mod: S$GLB,,, | Performed by: PHYSICIAN ASSISTANT

## 2021-10-11 PROCEDURE — 99999 PR PBB SHADOW E&M-EST. PATIENT-LVL III: ICD-10-PCS | Mod: PBBFAC,,, | Performed by: PHYSICIAN ASSISTANT

## 2021-10-11 PROCEDURE — 4010F ACE/ARB THERAPY RXD/TAKEN: CPT | Mod: CPTII,S$GLB,, | Performed by: PHYSICIAN ASSISTANT

## 2021-10-11 PROCEDURE — 3077F SYST BP >= 140 MM HG: CPT | Mod: CPTII,S$GLB,, | Performed by: PHYSICIAN ASSISTANT

## 2021-10-11 RX ORDER — TRAZODONE HYDROCHLORIDE 50 MG/1
TABLET ORAL
Qty: 90 TABLET | Refills: 1 | Status: SHIPPED | OUTPATIENT
Start: 2021-10-11 | End: 2022-08-10 | Stop reason: SDUPTHER

## 2021-10-13 ENCOUNTER — DOCUMENTATION ONLY (OUTPATIENT)
Dept: REHABILITATION | Facility: HOSPITAL | Age: 61
End: 2021-10-13

## 2021-10-13 DIAGNOSIS — R26.89 BALANCE DISORDER: Primary | ICD-10-CM

## 2021-10-13 DIAGNOSIS — R26.2 DIFFICULTY WALKING: ICD-10-CM

## 2021-10-13 DIAGNOSIS — M54.2 NECK PAIN: ICD-10-CM

## 2022-03-24 ENCOUNTER — OFFICE VISIT (OUTPATIENT)
Dept: GASTROENTEROLOGY | Facility: CLINIC | Age: 62
End: 2022-03-24
Payer: COMMERCIAL

## 2022-03-24 VITALS — WEIGHT: 201.75 LBS | BODY MASS INDEX: 29.88 KG/M2 | HEIGHT: 69 IN

## 2022-03-24 DIAGNOSIS — I10 ESSENTIAL HYPERTENSION: ICD-10-CM

## 2022-03-24 DIAGNOSIS — Z86.010 HISTORY OF COLONIC POLYPS: Primary | ICD-10-CM

## 2022-03-24 DIAGNOSIS — E66.3 OVERWEIGHT WITH BODY MASS INDEX (BMI) OF 29 TO 29.9 IN ADULT: ICD-10-CM

## 2022-03-24 DIAGNOSIS — Q23.1 BICUSPID AORTIC VALVE: ICD-10-CM

## 2022-03-24 DIAGNOSIS — I47.10 PAROXYSMAL SVT (SUPRAVENTRICULAR TACHYCARDIA): ICD-10-CM

## 2022-03-24 PROCEDURE — 3008F PR BODY MASS INDEX (BMI) DOCUMENTED: ICD-10-PCS | Mod: CPTII,S$GLB,, | Performed by: INTERNAL MEDICINE

## 2022-03-24 PROCEDURE — 4010F PR ACE/ARB THEARPY RXD/TAKEN: ICD-10-PCS | Mod: CPTII,S$GLB,, | Performed by: INTERNAL MEDICINE

## 2022-03-24 PROCEDURE — 99203 PR OFFICE/OUTPT VISIT, NEW, LEVL III, 30-44 MIN: ICD-10-PCS | Mod: S$GLB,,, | Performed by: INTERNAL MEDICINE

## 2022-03-24 PROCEDURE — 99999 PR PBB SHADOW E&M-EST. PATIENT-LVL III: CPT | Mod: PBBFAC,,, | Performed by: INTERNAL MEDICINE

## 2022-03-24 PROCEDURE — 3008F BODY MASS INDEX DOCD: CPT | Mod: CPTII,S$GLB,, | Performed by: INTERNAL MEDICINE

## 2022-03-24 PROCEDURE — 4010F ACE/ARB THERAPY RXD/TAKEN: CPT | Mod: CPTII,S$GLB,, | Performed by: INTERNAL MEDICINE

## 2022-03-24 PROCEDURE — 99203 OFFICE O/P NEW LOW 30 MIN: CPT | Mod: S$GLB,,, | Performed by: INTERNAL MEDICINE

## 2022-03-24 PROCEDURE — 99999 PR PBB SHADOW E&M-EST. PATIENT-LVL III: ICD-10-PCS | Mod: PBBFAC,,, | Performed by: INTERNAL MEDICINE

## 2022-03-24 NOTE — PROGRESS NOTES
Subjective:       Patient ID: Lynn Turner is a 61 y.o. female.    Chief Complaint: No chief complaint on file.    This is my 1st encounter with this pleasant 61-year-old female, who is coming due for her surveillance colonoscopy.  Her last colonoscopy was in January 2016, with Dr. Gutierrez, see below.  She does report that her bowel movements or more sluggish than they used to be.  But then on some days, she will have a bowel movement for a 5 times a day.  Otherwise, she thinks she is eating a healthy diet.  She seen no blood in her bowel movements.  She also has a cardiac history as is noted by her medications and previous echo report.  She has a bicuspid aortic valve.  She has a history of arhythmia, and is on amiodarone and amlodipine.          Lynn Turner  to Tobi Rachel, [facility]      1/25/22 8:14 AM  I am past due for a colonoscopy and since Dr Best is no longer there, I was not sure who I needed to see about it. Can you help get it scheduled or do I need to see the new doctor at your clinic first?      Last Colonoscopy 1/14/2016:  Indications:          Screening for colorectal malignant neoplasm, This is the patient's first colonoscopy   Providers:            Sushant Gutierrez MD  Impression:   - One 1 mm polyp in the mid ascending colon.                         Resected and retrieved.                         - One 2 mm polyp in the distal ascending colon.                         Resected and retrieved.                         - One 2 mm polyp in the sigmoid colon. Resected and                         retrieved.                         - Diverticulosis in the sigmoid colon.                         - The anus, rectum, descending colon, transverse                         colon, cecum, appendiceal orifice and ileocecal                         valve are normal.   Recommendation:       - Discharge patient to home (via wheelchair).                         - Resume previous diet indefinitely.                          - Continue present medications.                         - Await pathology results.                         - Repeat colonoscopy in 5 years for surveillance.                         - Telephone my office for pathology results in 1 week.   Sushant Gutierrez MD   1/14/2016     SPECIMEN  1) Ascending and distal hepatic flexure polyps.  2) Pedunculated 2 mm sigmoid polyp.  FINAL PATHOLOGIC DIAGNOSIS  1. Ascending colon and distal hepatic flexure, biopsy:  Tubular adenoma.  Fragment colonic mucosa showing features suggestive of early adenomatous transformation.  2. Sigmoid colon, biopsy:  Tubular adenoma.      Hx of bicuspid aortic valve.  See last ECHO 10/21/2021:  Echocardiography Findings  Left Ventricle  The left ventricle is normal in size with normal systolic function. The estimated ejection fraction is 60%. The wall thickness is normal. There is normal diastolic function. There is normal wall motion.  Right Ventricle  Normal cavity size, wall thickness and systolic function. Wall motion normal.  Left Atrium  The left atrial volume index is normal. Left atrial volume index is 24.0 mL/m2.  Right Atrium  The right atrium is normal in size.  Aortic Valve  Aortic valve not well visualized due to poor sonic window. Mild to moderate regurgitation.  Mitral Valve  There is mild mitral valve regurgitation. The estimated mitral valve area by pressure half time is 3.86 cm2.  Tricuspid Valve  There is trace regurgitation. The estimated PA systolic pressure is greater than 27 mmHg.  Pulmonic Valve  Pulmonic valve is not well visualized due to poor sonic window.  IVC/SVC  Normal central venous pressure (3 mm Hg).  Ascending Aorta  The aortic root and ascending aorta are normal in size.  Pericardium and Other Findings  Pericardium is normal. There is no pericardial effusion.          Review of Systems   Constitutional: Negative for activity change, appetite change, fatigue, fever and unexpected weight change.   HENT:  Negative.  Negative for dental problem, drooling, mouth sores, sore throat, trouble swallowing and voice change.    Eyes: Negative.    Respiratory: Negative for cough, choking, shortness of breath, wheezing and stridor.    Cardiovascular: Negative for chest pain.   Gastrointestinal: Negative for abdominal distention, abdominal pain, anal bleeding, blood in stool, constipation, diarrhea, nausea, rectal pain and vomiting.   Genitourinary: Negative.    Musculoskeletal: Negative for arthralgias, joint swelling, myalgias and neck stiffness.   Integumentary:  Negative for color change and rash.   Neurological: Negative for weakness.   Hematological: Negative for adenopathy. Does not bruise/bleed easily.   Psychiatric/Behavioral: Negative for agitation, behavioral problems, confusion, decreased concentration and dysphoric mood.         Objective:      Physical Exam  Constitutional:       General: She is not in acute distress.     Appearance: She is well-developed.   HENT:      Head: Normocephalic.      Nose: Nose normal.      Mouth/Throat:      Pharynx: No oropharyngeal exudate.   Eyes:      General: No scleral icterus.     Conjunctiva/sclera: Conjunctivae normal.   Neck:      Thyroid: No thyromegaly.      Trachea: No tracheal deviation.   Cardiovascular:      Rate and Rhythm: Normal rate and regular rhythm.      Heart sounds: Normal heart sounds. No murmur heard.    No gallop.   Pulmonary:      Effort: Pulmonary effort is normal.      Breath sounds: Normal breath sounds. No wheezing or rales.   Abdominal:      General: Bowel sounds are normal. There is no distension.      Palpations: Abdomen is soft. There is no mass.      Tenderness: There is no abdominal tenderness. There is no guarding.   Musculoskeletal:         General: Normal range of motion.      Cervical back: Neck supple.   Lymphadenopathy:      Cervical: No cervical adenopathy.   Skin:     General: Skin is warm and dry.      Findings: No erythema or rash.    Neurological:      Mental Status: She is alert and oriented to person, place, and time.   Psychiatric:         Behavior: Behavior normal.         Assessment:         History of colonic polyps    Bicuspid aortic valve    Paroxysmal SVT (supraventricular tachycardia)    Essential hypertension    Overweight with body mass index (BMI) of 29 to 29.9 in adult      Plan:        1. Sched for colonoscopy.

## 2022-06-08 ENCOUNTER — ANESTHESIA EVENT (OUTPATIENT)
Dept: ENDOSCOPY | Facility: HOSPITAL | Age: 62
End: 2022-06-08
Payer: COMMERCIAL

## 2022-06-09 ENCOUNTER — HOSPITAL ENCOUNTER (OUTPATIENT)
Facility: HOSPITAL | Age: 62
Discharge: HOME | End: 2022-06-09
Attending: INTERNAL MEDICINE | Admitting: INTERNAL MEDICINE
Payer: COMMERCIAL

## 2022-06-09 ENCOUNTER — ANESTHESIA (OUTPATIENT)
Dept: ENDOSCOPY | Facility: HOSPITAL | Age: 62
End: 2022-06-09
Payer: COMMERCIAL

## 2022-06-09 DIAGNOSIS — Z86.010 HX OF COLONIC POLYPS: ICD-10-CM

## 2022-06-09 PROBLEM — Z86.0100 HISTORY OF COLON POLYPS: Status: ACTIVE | Noted: 2022-06-09

## 2022-06-09 PROCEDURE — D9220A PRA ANESTHESIA: ICD-10-PCS | Mod: 33,CRNA,, | Performed by: NURSE ANESTHETIST, CERTIFIED REGISTERED

## 2022-06-09 PROCEDURE — D9220A PRA ANESTHESIA: Mod: 33,ANES,, | Performed by: ANESTHESIOLOGY

## 2022-06-09 PROCEDURE — 45385 COLONOSCOPY W/LESION REMOVAL: CPT | Mod: 33,,, | Performed by: INTERNAL MEDICINE

## 2022-06-09 PROCEDURE — 27201089 HC SNARE, DISP (ANY): Mod: PO | Performed by: INTERNAL MEDICINE

## 2022-06-09 PROCEDURE — 37000008 HC ANESTHESIA 1ST 15 MINUTES: Mod: PO | Performed by: INTERNAL MEDICINE

## 2022-06-09 PROCEDURE — D9220A PRA ANESTHESIA: ICD-10-PCS | Mod: 33,ANES,, | Performed by: ANESTHESIOLOGY

## 2022-06-09 PROCEDURE — 37000009 HC ANESTHESIA EA ADD 15 MINS: Mod: PO | Performed by: INTERNAL MEDICINE

## 2022-06-09 PROCEDURE — 25000003 PHARM REV CODE 250: Mod: PO | Performed by: INTERNAL MEDICINE

## 2022-06-09 PROCEDURE — 25000003 PHARM REV CODE 250: Mod: PO | Performed by: NURSE ANESTHETIST, CERTIFIED REGISTERED

## 2022-06-09 PROCEDURE — 63600175 PHARM REV CODE 636 W HCPCS: Mod: PO | Performed by: INTERNAL MEDICINE

## 2022-06-09 PROCEDURE — 45385 PR COLONOSCOPY,REMV LESN,SNARE: ICD-10-PCS | Mod: 33,,, | Performed by: INTERNAL MEDICINE

## 2022-06-09 PROCEDURE — 88305 TISSUE EXAM BY PATHOLOGIST: ICD-10-PCS | Mod: 26,,, | Performed by: PATHOLOGY

## 2022-06-09 PROCEDURE — 45385 COLONOSCOPY W/LESION REMOVAL: CPT | Mod: PT,PO | Performed by: INTERNAL MEDICINE

## 2022-06-09 PROCEDURE — 63600175 PHARM REV CODE 636 W HCPCS: Mod: PO | Performed by: NURSE ANESTHETIST, CERTIFIED REGISTERED

## 2022-06-09 PROCEDURE — 88305 TISSUE EXAM BY PATHOLOGIST: CPT | Mod: 26,,, | Performed by: PATHOLOGY

## 2022-06-09 PROCEDURE — 88305 TISSUE EXAM BY PATHOLOGIST: CPT | Performed by: PATHOLOGY

## 2022-06-09 PROCEDURE — D9220A PRA ANESTHESIA: Mod: 33,CRNA,, | Performed by: NURSE ANESTHETIST, CERTIFIED REGISTERED

## 2022-06-09 RX ORDER — PROPOFOL 10 MG/ML
VIAL (ML) INTRAVENOUS
Status: DISCONTINUED | OUTPATIENT
Start: 2022-06-09 | End: 2022-06-09

## 2022-06-09 RX ORDER — LIDOCAINE HYDROCHLORIDE 20 MG/ML
INJECTION INTRAVENOUS
Status: DISCONTINUED | OUTPATIENT
Start: 2022-06-09 | End: 2022-06-09

## 2022-06-09 RX ORDER — CLINDAMYCIN PHOSPHATE 600 MG/50ML
600 INJECTION, SOLUTION INTRAVENOUS ONCE
Status: COMPLETED | OUTPATIENT
Start: 2022-06-09 | End: 2022-06-09

## 2022-06-09 RX ORDER — SODIUM CHLORIDE 0.9 % (FLUSH) 0.9 %
10 SYRINGE (ML) INJECTION
Status: DISCONTINUED | OUTPATIENT
Start: 2022-06-09 | End: 2022-06-09 | Stop reason: HOSPADM

## 2022-06-09 RX ORDER — SODIUM CHLORIDE, SODIUM LACTATE, POTASSIUM CHLORIDE, CALCIUM CHLORIDE 600; 310; 30; 20 MG/100ML; MG/100ML; MG/100ML; MG/100ML
INJECTION, SOLUTION INTRAVENOUS CONTINUOUS
Status: DISCONTINUED | OUTPATIENT
Start: 2022-06-09 | End: 2022-06-09 | Stop reason: HOSPADM

## 2022-06-09 RX ADMIN — PROPOFOL 50 MG: 10 INJECTION, EMULSION INTRAVENOUS at 10:06

## 2022-06-09 RX ADMIN — SODIUM CHLORIDE, SODIUM LACTATE, POTASSIUM CHLORIDE, AND CALCIUM CHLORIDE: .6; .31; .03; .02 INJECTION, SOLUTION INTRAVENOUS at 10:06

## 2022-06-09 RX ADMIN — PROPOFOL 100 MG: 10 INJECTION, EMULSION INTRAVENOUS at 10:06

## 2022-06-09 RX ADMIN — CLINDAMYCIN IN 5 PERCENT DEXTROSE 600 MG: 12 INJECTION, SOLUTION INTRAVENOUS at 10:06

## 2022-06-09 RX ADMIN — LIDOCAINE HYDROCHLORIDE 50 MG: 20 INJECTION, SOLUTION INTRAVENOUS at 10:06

## 2022-06-09 NOTE — H&P
History & Physical - Short Stay  Gastroenterology      SUBJECTIVE:     Procedure: Colonoscopy    Chief Complaint/Indication for Procedure: Surveillance, Hx of colon polyps.    History of Present Illness:  Asymptomatic    See recent GI OV note:  Office Visit   3/24/2022  [facility]     Bakari Mai Jr., MD    Gastroenterology  History of colonic polyps +4 more    Dx  Constipation   Establish Care; Referred by Aaareferral Self    Reason for Visit     Progress Notes  Bakari aMi Jr., MD (Physician)   Gastroenterology  Subjective:       Patient ID: Lynn Turner is a 61 y.o. female.     Chief Complaint: No chief complaint on file.     This is my 1st encounter with this pleasant 61-year-old female, who is coming due for her surveillance colonoscopy.  Her last colonoscopy was in January 2016, with Dr. Gutierrez, see below.  She does report that her bowel movements or more sluggish than they used to be.  But then on some days, she will have a bowel movement for a 5 times a day.  Otherwise, she thinks she is eating a healthy diet.  She seen no blood in her bowel movements.  She also has a cardiac history as is noted by her medications and previous echo report.  She has a bicuspid aortic valve.  She has a history of arhythmia, and is on amiodarone and amlodipine.              Lynn Turner  to Tobi Rachel, [facility]      1/25/22 8:14 AM  I am past due for a colonoscopy and since Dr Best is no longer there, I was not sure who I needed to see about it. Can you help get it scheduled or do I need to see the new doctor at your clinic first?        Last Colonoscopy 1/14/2016:  Indications:          Screening for colorectal malignant neoplasm, This is the patient's first colonoscopy   Providers:            Sushant Gutierrez MD  Impression:   - One 1 mm polyp in the mid ascending colon.                         Resected and retrieved.                         - One 2 mm polyp in the distal ascending  colon.                         Resected and retrieved.                         - One 2 mm polyp in the sigmoid colon. Resected and                         retrieved.                         - Diverticulosis in the sigmoid colon.                         - The anus, rectum, descending colon, transverse                         colon, cecum, appendiceal orifice and ileocecal                         valve are normal.   Recommendation:       - Discharge patient to home (via wheelchair).                         - Resume previous diet indefinitely.                         - Continue present medications.                         - Await pathology results.                         - Repeat colonoscopy in 5 years for surveillance.                         - Telephone my office for pathology results in 1 week.   Sushant Gutierrez MD   1/14/2016      SPECIMEN  1) Ascending and distal hepatic flexure polyps.  2) Pedunculated 2 mm sigmoid polyp.  FINAL PATHOLOGIC DIAGNOSIS  1. Ascending colon and distal hepatic flexure, biopsy:  Tubular adenoma.  Fragment colonic mucosa showing features suggestive of early adenomatous transformation.  2. Sigmoid colon, biopsy:  Tubular adenoma.        Hx of bicuspid aortic valve.  See last ECHO 10/21/2021:  Echocardiography Findings  Left Ventricle  The left ventricle is normal in size with normal systolic function. The estimated ejection fraction is 60%. The wall thickness is normal. There is normal diastolic function. There is normal wall motion.  Right Ventricle  Normal cavity size, wall thickness and systolic function. Wall motion normal.  Left Atrium  The left atrial volume index is normal. Left atrial volume index is 24.0 mL/m2.  Right Atrium  The right atrium is normal in size.  Aortic Valve  Aortic valve not well visualized due to poor sonic window. Mild to moderate regurgitation.  Mitral Valve  There is mild mitral valve regurgitation. The estimated mitral valve area by pressure half time is  3.86 cm2.  Tricuspid Valve  There is trace regurgitation. The estimated PA systolic pressure is greater than 27 mmHg.  Pulmonic Valve  Pulmonic valve is not well visualized due to poor sonic window.  IVC/SVC  Normal central venous pressure (3 mm Hg).  Ascending Aorta  The aortic root and ascending aorta are normal in size.  Pericardium and Other Findings  Pericardium is normal. There is no pericardial effusion.              Review of Systems   Constitutional: Negative for activity change, appetite change, fatigue, fever and unexpected weight change.   HENT: Negative.  Negative for dental problem, drooling, mouth sores, sore throat, trouble swallowing and voice change.    Eyes: Negative.    Respiratory: Negative for cough, choking, shortness of breath, wheezing and stridor.    Cardiovascular: Negative for chest pain.   Gastrointestinal: Negative for abdominal distention, abdominal pain, anal bleeding, blood in stool, constipation, diarrhea, nausea, rectal pain and vomiting.         Assessment:         History of colonic polyps     Bicuspid aortic valve     Paroxysmal SVT (supraventricular tachycardia)     Essential hypertension     Overweight with body mass index (BMI) of 29 to 29.9 in adult        Plan:        1. Sched for colonoscopy.                PTA Medications   Medication Sig    amiodarone (PACERONE) 200 MG Tab Take 2 tablets (400mg) daily for 1 week, then reduce to 1 (200mg) tablet daily    amLODIPine (NORVASC) 10 MG tablet Take 1 tablet (10 mg total) by mouth once daily.    aspirin 325 MG tablet Take 325 mg by mouth once daily.    chlorthalidone (HYGROTEN) 25 MG Tab Take 25 mg by mouth once daily.    losartan (COZAAR) 100 MG tablet Take 1 tablet (100 mg total) by mouth once daily.    rosuvastatin (CRESTOR) 5 MG tablet TAKE 1 TABLET BY MOUTH EVERY DAY    traZODone (DESYREL) 50 MG tablet 1  tablet approx 2 hours before bed every night       Review of patient's allergies indicates:   Allergen Reactions  "   Lisinopril Other (See Comments)     uncontrollable cough    Penicillins Hives and Swelling    Erythromycin Hives    Ibuprofen Itching and Rash     Aspirin OK    Tetracyclines Hives        Past Medical History:   Diagnosis Date    Aortic insufficiency     Bicuspid aortic valve     Carotid stenosis     Headache     Hyperlipidemia     Hypertension     diet controlled    Palpitations     Psoriasis     SVT (supraventricular tachycardia)     Tobacco use      Past Surgical History:   Procedure Laterality Date    BREAST SURGERY Right     hematoma    CARDIAC ELECTROPHYSIOLOGY STUDY AND ABLATION  2013    COLONOSCOPY N/A 1/14/2016    Procedure: COLONOSCOPY;  Surgeon: Sushant Gutierrez MD;  Location[facility];  Service: Endoscopy;  Laterality: N/A;    HYSTERECTOMY  1995    partial    KNEE ARTHROSCOPY Right      Family History   Problem Relation Age of Onset    Cataracts Mother     Stroke Mother     Diabetes Mother     Hypertension Mother     Hyperlipidemia Mother     Macular degeneration Father     Cataracts Father     Heart disease Father     Coronary artery disease Father     Diabetes Father     Hypertension Father      Social History     Tobacco Use    Smoking status: Current Every Day Smoker     Packs/day: 1.00     Years: 40.00     Pack years: 40.00     Types: Cigarettes     Start date: 1/1/1976    Smokeless tobacco: Never Used    Tobacco comment: Pt has stopped & restarted multiple times   Substance Use Topics    Alcohol use: Yes     Comment: occasional    Drug use: No         OBJECTIVE:     Vital Signs (Most Recent)  Temp: 98.2 °F (36.8 °C) (06/09/22 0954)  Pulse: (!) 49 (06/09/22 0954)  Resp: 17 (06/09/22 0954)  BP: 135/61 (06/09/22 0954)  SpO2: 97 % (06/09/22 0954)    Physical Exam:  :Ht: 5' 9" (175.3 cm)   Wt: 90.7 kg (200 lb)   BMI: 29.53 kg/m²                                                          GENERAL:  Comfortable, in no acute distress.                               "   HEENT EXAM:  Nonicteric.  No adenopathy.  Oropharynx is clear.               NECK:  Supple.                                                               LUNGS:  Clear.                                                               CARDIAC:  Regular rate and rhythm.  S1, S2.  No murmur.                      ABDOMEN:  Obese.   Soft, positive bowel sounds, nontender.  No hepatosplenomegaly or masses.  No rebound or guarding.                                             EXTREMITIES:  No edema.     MENTAL STATUS:  Alert and oriented.    ASSESSMENT/PLAN:     Assessment: Surveillance, Hx of colon polyps.    Plan: Colonoscopy.  Cover with abx.    Anesthesia Plan:   MAC / General Anaesthesia    ASA Grade: ASA 2 - Patient with mild systemic disease with no functional limitations    MALLAMPATI SCORE: II (hard and soft palate, upper portion of tonsils anduvula visible)     - - -

## 2022-06-09 NOTE — [employer] PATIENT INSTRUCTIONS
Discharge Summary/Instructions for after Colonoscopy with   Biopsy/Polypectomy  Lynn Turner    Thursday, June 9, 2022  Bakari Mai MD  RESTRICTIONS ON ACTIVITY:  - Do not drive a car or operate machinery until the day after the procedure.      - The following day: return to full activity including work.  - For  3 days: No heavy lifting, straining or running.  - Diet: You can have solid foods, but no gassy foods (i.e. beans, broccoli,   cabbage, etc).  TREATMENT FOR COMMON SIDE EFFECTS:  - Mild abdominal pain and bloating or excessive gas: rest, eat lightly and   use a heating pad.  SYMPTOMS TO WATCH FOR AND REPORT TO YOUR PHYSICIAN:  1. Severe abdominal pain.  2. Fever within 24 hours after a procedure.  3. A large amount of rectal bleeding. (A small amount of blood from the   rectum is not serious, especially if hemorrhoids are present.  3.  Because air was put into your colon during the procedure, expelling   large amounts of air from your rectum is normal.  4.  You may not have a bowel movement for 1-3 days because of the   colonoscopy prep.  This is normal.  5.  Call immediately if you notice any of the following:   Chills and/or fever over 101   Persistent vomiting   Severe abdominal pain, other than gas cramps   Severe chest pain   Black, tarry stools   Any bleeding - exceeding one tablespoon  Your doctor recommends these additional instructions:  We are waiting for your pathology results.   Your physician has recommended a repeat colonoscopy in 5 years for   surveillance.   Eat a high fiber diet.   Take a fiber supplement, for example Citrucel, Fibercon, Konsyl or   Metamucil.  None  If you have any questions or problems, please call your physician.  EMERGENCY PHONE NUMBER: [phone]  LAB RESULTS: Call in two (2) weeks for lab results, [phone]  ___________________________________________  Nurse Signature  ___________________________________________  Patient/Designated Responsible Party  Signature  Bakari Mai MD  6/9/2022 11:08:55 AM  This report has been verified and signed electronically.  Dear patient,  As a result of recent federal legislation (The Federal Cures Act), you may   receive lab or pathology results from your procedure in your [facility]   account before your physician is able to contact you. Your physician or   their representative will relay the results to you with their   recommendations at their soonest availability.  Thank you.  [employer]

## 2022-06-09 NOTE — BRIEF OP NOTE
Discharge Note  Short Stay      SUMMARY     Admit Date: 6/9/2022    Attending Physician: Bakari Mai Jr., MD     Discharge Physician: Bakari Mai Jr., MD    Discharge Date: 6/9/2022 11:10 AM    Final Diagnosis: History of colon polyps [Z86.010]    Impression:            - One <2 mm polyp at the appendiceal orifice,                          removed with a cold snare. Resected and retrieved.                          - Diverticulosis in the proximal sigmoid colon.                          - Non-bleeding internal hemorrhoids.                          - The examination was otherwise normal.                          - The examined portion of the ileum was normal.   Recommendation:        - Discharge patient to home.                          - Await pathology results.                          - Repeat colonoscopy in 5 years for surveillance.                          - High fiber diet.                          - Use fiber, for example Citrucel, Fibercon,                          Konsyl or Metamucil.                          - Call the [facility] clinic in 2 weeks for reports (if                          you haven't heard from us sooner) [phone].                          - Continue present medications.                          - Patient has a contact number available for                          emergencies. The signs and symptoms of potential                          delayed complications were discussed with the                          patient. Return to normal activities tomorrow.                          Written discharge instructions were provided to                          the patient.                          - Return to normal activities tomorrow.   Bakari Mai MD   6/9/2022       Disposition: HOME OR SELF CARE    Patient Instructions:   Current Discharge Medication List      CONTINUE these medications which have NOT CHANGED    Details   amiodarone (PACERONE) 200 MG Tab Take 2 tablets (400mg) daily  for 1 week, then reduce to 1 (200mg) tablet daily  Qty: 30 tablet, Refills: 11      amLODIPine (NORVASC) 10 MG tablet Take 1 tablet (10 mg total) by mouth once daily.  Qty: 30 tablet, Refills: 11    Comments: .      aspirin 325 MG tablet Take 325 mg by mouth once daily.      chlorthalidone (HYGROTEN) 25 MG Tab Take 25 mg by mouth once daily.      losartan (COZAAR) 100 MG tablet Take 1 tablet (100 mg total) by mouth once daily.  Qty: 30 tablet, Refills: 11    Comments: .  Associated Diagnoses: Essential hypertension      rosuvastatin (CRESTOR) 5 MG tablet TAKE 1 TABLET BY MOUTH EVERY DAY  Qty: 90 tablet, Refills: 3      traZODone (DESYREL) 50 MG tablet 1  tablet approx 2 hours before bed every night  Qty: 90 tablet, Refills: 1             Discharge Procedure Orders (must include Diet, Follow-up, Activity)    Follow Up:  Follow up with PCP as per your routine.  Please follow a high fiber diet.  Activity as tolerated.    No driving day of procedure.

## 2022-06-09 NOTE — ANESTHESIA POSTPROCEDURE EVALUATION
Anesthesia Post Evaluation    Patient: Lynn Turner    Procedure(s) Performed: Procedure(s) (LRB):  COLONOSCOPY (N/A)    Final Anesthesia Type: general      Patient location during evaluation: PACU  Patient participation: Yes- Able to Participate  Level of consciousness: sedated and awake  Post-procedure vital signs: reviewed and stable  Pain management: adequate  Airway patency: patent    PONV status at discharge: No PONV  Anesthetic complications: no      Cardiovascular status: blood pressure returned to baseline  Respiratory status: spontaneous ventilation  Hydration status: euvolemic  Follow-up not needed.          Vitals Value Taken Time   /60 06/09/22 1124   Temp 36.7 °C (98 °F) 06/09/22 1054   Pulse 48 06/09/22 1124   Resp 18 06/09/22 1124   SpO2 98 % 06/09/22 1124         Event Time   Out of Recovery 11:28:00         Pain/Dolly Score: Dolly Score: 8 (6/9/2022 10:54 AM)

## 2022-06-09 NOTE — TRANSFER OF CARE
"Anesthesia Transfer of Care Note    Patient: Lynn Turner    Procedure(s) Performed: Procedure(s) (LRB):  COLONOSCOPY (N/A)    Patient location: PACU    Anesthesia Type: general    Transport from OR: Transported from OR on 2-3 L/min O2 by NC with adequate spontaneous ventilation    Post pain: adequate analgesia    Post assessment: no apparent anesthetic complications and tolerated procedure well    Post vital signs: stable    Level of consciousness: sedated    Nausea/Vomiting: no nausea/vomiting    Complications: none    Transfer of care protocol was followed      Last vitals:   Visit Vitals  /61 (BP Location: Right arm, Patient Position: Lying)   Pulse (!) 49   Temp 36.8 °C (98.2 °F) (Skin)   Resp 17   Ht 5' 9" (1.753 m)   Wt 90.7 kg (200 lb)   SpO2 97%   Breastfeeding No   BMI 29.53 kg/m²     "

## 2022-06-09 NOTE — PATIENT INSTRUCTIONS
Recovery After Procedural Sedation (Adult)   You have been given medicine by vein to make you sleep during your surgery. This may have included both a pain medicine and sleeping medicine. Most of the effects have worn off. But you may still have some drowsiness for the next 6 to 8 hours.  Home care  Follow these guidelines when you get home:  For the next 8 hours, you should be watched by a responsible adult. This person should make sure your condition is not getting worse.  Don't drink any alcohol for the next 24 hours.  Don't drive, operate dangerous machinery, or make important business or personal decisions during the next 24 hours.  To prevent injury or falls, use caution when standing and walking for at least 24 hours after your procedure.  Note: Your healthcare provider may tell you not to take any medicine by mouth for pain or sleep in the next 4 hours. These medicines may react with the medicines you were given in the hospital. This could cause a much stronger response than usual.  Follow-up care  Follow up with your healthcare provider if you are not alert and back to your usual level of activity within 12 hours.  When to seek medical advice  Call your healthcare provider right away if any of these occur:  Drowsiness gets worse  Weakness or dizziness gets worse  Repeated vomiting  You can't be awakened  Fever  New rash  [employer] last reviewed this educational content on 9/1/2019  © 6479-9715 [employer]. [address] [place] [address], [place] [id number]. All rights reserved. This information is not intended as a substitute for professional medical care. Always follow your healthcare professional's instructions         High-Fiber Diet  Fiber is in fruits, vegetables, cereals, and grains. Fiber passes through your body undigested. A high-fiber diet helps food move through your intestinal tract. The added bulk is helpful in preventing constipation. In people with diverticulosis, fiber helps clean out  the pouches along the colon wall. It also prevents new pouches from forming. A high-fiber diet reduces the risk of colon cancer. It also lowers blood cholesterol and prevents high blood sugar in people with diabetes.    The fiber-rich foods listed below should be part of your diet. If you are not used to high-fiber foods, start with 1 or 2 foods from this list. Every 3 to 4 days add a new one to your diet. Do this until you are eating 4 high-fiber foods per day. This should give you 20 to 35 grams of fiber a day. It is also important to drink a lot of water when you are on this diet. You should have 6 to 8 glasses of water a day. Water makes the fiber swell and increases the benefit.  Foods high in dietary fiber  The following foods are high in dietary fiber:  Breads. Breads made with 100% whole-wheat flour; ru, wheat, or rye crackers; whole-grain tortillas, bran muffins.  Cereals. Whole-grain and bran cereals with bran (shredded wheat, wheat flakes, raisin bran, corn bran); oatmeal, rolled oats, granola, and brown rice.  Fruits. Fresh fruits and their edible skins (pears, prunes, raisins, berries, apples, and apricots); bananas, citrus fruit, mangoes, pineapple; and prune juice.  Nuts. Any nuts and seeds.  Vegetables. Best served raw or lightly cooked. All types, especially: green peas, celery, eggplant, potatoes, spinach, broccoli, [place] sprouts, winter squash, carrots, cauliflower, soybeans, lentils, and fresh and dried beans of all kinds.  Other. Popcorn, any spices.  Date Last Reviewed: 8/1/2016  © 9012-2035 [employer]. [address] [place] [address], [place] [address]. All rights reserved. This information is not intended as a substitute for professional medical care. Always follow your healthcare professional's instructions.

## 2022-06-09 NOTE — PLAN OF CARE
Patient AAOx4. Resp even, unlabored. No complaints of pain at this time. NAD noted. Patient tolerating PO well. Discharge and follow-up instructions discussed. Patient and  verbalized understanding. Patient meets criteria for discharge home.

## 2022-06-09 NOTE — ANESTHESIA PREPROCEDURE EVALUATION
06/09/2022  Lynn Turner is a 61 y.o., female.    Pre-op Assessment    I have reviewed the Patient Summary Reports.    I have reviewed the Nursing Notes. I have reviewed the NPO Status.   I have reviewed the Medications.     Review of Systems  Anesthesia Hx:  No problems with previous Anesthesia  Denies Family Hx of Anesthesia complications.   Denies Personal Hx of Anesthesia complications.   Social:  Smoker 1/4 ppd every other day x 40 years    Cardiovascular:   Hypertension Valvular problems/Murmurs, AI Dysrhythmias ECG has been reviewed. H/o svt with ablation 2 years ago  Took toprol this am   · The estimated ejection fraction is 60%.  · The left ventricle is normal in size with normal systolic function.  · Normal left ventricular diastolic function.  · Normal right ventricular size with normal right ventricular systolic function.  · The aortic valve is not well visualized but by Doppler flow there is Mild-to-moderate aortic regurgitation.  · Mild mitral regurgitation.     Pulmonary:  Pulmonary Normal    Renal/:  Renal/ Normal     Hepatic/GI:  Hepatic/GI Normal    Neurological:   Headaches    Endocrine:  Endocrine Normal        Physical Exam  General:  Well nourished         Chest/Lungs:  Chest/Lungs Findings: Clear to auscultation      Heart/Vascular:  Heart Findings: Rate: Normal  Rhythm: Regular Rhythm             Anesthesia Plan  Type of Anesthesia, risks & benefits discussed:  Anesthesia Type:  Gen Natural Airway    Patient's Preference:   Plan Factors:          Intra-op Monitoring Plan: Standard ASA Monitors  Intra-op Monitoring Plan Comments:   Post Op Pain Control Plan:   Post Op Pain Control Plan Comments:     Induction:   IV  Beta Blocker:  Patient is on a Beta-Blocker and has received one dose within the past 24 hours (No further documentation required).       Informed Consent: Informed  consent signed with the Patient and all parties understand the risks and agree with anesthesia plan.  All questions answered.  Anesthesia consent signed with patient.  ASA Score: 2     Day of Surgery Review of History & Physical: I have interviewed and examined the patient. I have reviewed the patient's H&P dated:  There are no significant changes.            Ready For Surgery From Anesthesia Perspective.           Physical Exam  General: Well nourished    Chest/Lungs:  Clear to auscultation    Heart:  Rate: Normal  Rhythm: Regular Rhythm          Anesthesia Plan  Type of Anesthesia, risks & benefits discussed:    Anesthesia Type: Gen Natural Airway  Intra-op Monitoring Plan: Standard ASA Monitors  Induction:  IV  Informed Consent: Informed consent signed with the Patient and all parties understand the risks and agree with anesthesia plan.  All questions answered.   ASA Score: 2  Day of Surgery Review of History & Physical: I have interviewed and examined the patient. I have reviewed the patient's H&P dated:     Ready For Surgery From Anesthesia Perspective.       .

## 2022-06-10 VITALS
DIASTOLIC BLOOD PRESSURE: 60 MMHG | WEIGHT: 200 LBS | TEMPERATURE: 98 F | OXYGEN SATURATION: 98 % | SYSTOLIC BLOOD PRESSURE: 126 MMHG | HEART RATE: 48 BPM | RESPIRATION RATE: 18 BRPM | BODY MASS INDEX: 29.62 KG/M2 | HEIGHT: 69 IN

## 2022-06-24 LAB
FINAL PATHOLOGIC DIAGNOSIS: NORMAL
Lab: NORMAL

## 2022-06-28 ENCOUNTER — TELEPHONE (OUTPATIENT)
Dept: GASTROENTEROLOGY | Facility: CLINIC | Age: 62
End: 2022-06-28
Payer: COMMERCIAL

## 2022-07-11 PROBLEM — G47.33 OBSTRUCTIVE SLEEP APNEA: Status: ACTIVE | Noted: 2022-07-11

## 2022-07-11 PROBLEM — E78.00 PURE HYPERCHOLESTEROLEMIA: Status: ACTIVE | Noted: 2018-09-26

## 2022-08-10 RX ORDER — TRAZODONE HYDROCHLORIDE 50 MG/1
TABLET ORAL
Qty: 90 TABLET | Refills: 0 | Status: SHIPPED | OUTPATIENT
Start: 2022-08-10 | End: 2022-09-27 | Stop reason: SDUPTHER

## 2022-11-14 ENCOUNTER — OFFICE VISIT (OUTPATIENT)
Dept: OPTOMETRY | Facility: CLINIC | Age: 62
End: 2022-11-14
Payer: COMMERCIAL

## 2022-11-14 DIAGNOSIS — H01.02B SQUAMOUS BLEPHARITIS OF UPPER AND LOWER EYELIDS OF BOTH EYES: Primary | ICD-10-CM

## 2022-11-14 DIAGNOSIS — H01.02A SQUAMOUS BLEPHARITIS OF UPPER AND LOWER EYELIDS OF BOTH EYES: Primary | ICD-10-CM

## 2022-11-14 DIAGNOSIS — H25.13 NUCLEAR SCLEROSIS OF BOTH EYES: ICD-10-CM

## 2022-11-14 DIAGNOSIS — H52.7 REFRACTIVE ERROR: ICD-10-CM

## 2022-11-14 PROCEDURE — 1160F PR REVIEW ALL MEDS BY PRESCRIBER/CLIN PHARMACIST DOCUMENTED: ICD-10-PCS | Mod: CPTII,S$GLB,, | Performed by: OPTOMETRIST

## 2022-11-14 PROCEDURE — 92004 PR EYE EXAM, NEW PATIENT,COMPREHESV: ICD-10-PCS | Mod: S$GLB,,, | Performed by: OPTOMETRIST

## 2022-11-14 PROCEDURE — 92015 PR REFRACTION: ICD-10-PCS | Mod: S$GLB,,, | Performed by: OPTOMETRIST

## 2022-11-14 PROCEDURE — 4010F PR ACE/ARB THEARPY RXD/TAKEN: ICD-10-PCS | Mod: CPTII,S$GLB,, | Performed by: OPTOMETRIST

## 2022-11-14 PROCEDURE — 92015 DETERMINE REFRACTIVE STATE: CPT | Mod: S$GLB,,, | Performed by: OPTOMETRIST

## 2022-11-14 PROCEDURE — 1159F PR MEDICATION LIST DOCUMENTED IN MEDICAL RECORD: ICD-10-PCS | Mod: CPTII,S$GLB,, | Performed by: OPTOMETRIST

## 2022-11-14 PROCEDURE — 99999 PR PBB SHADOW E&M-EST. PATIENT-LVL III: ICD-10-PCS | Mod: PBBFAC,,, | Performed by: OPTOMETRIST

## 2022-11-14 PROCEDURE — 99999 PR PBB SHADOW E&M-EST. PATIENT-LVL III: CPT | Mod: PBBFAC,,, | Performed by: OPTOMETRIST

## 2022-11-14 PROCEDURE — 3044F PR MOST RECENT HEMOGLOBIN A1C LEVEL <7.0%: ICD-10-PCS | Mod: CPTII,S$GLB,, | Performed by: OPTOMETRIST

## 2022-11-14 PROCEDURE — 92004 COMPRE OPH EXAM NEW PT 1/>: CPT | Mod: S$GLB,,, | Performed by: OPTOMETRIST

## 2022-11-14 PROCEDURE — 1160F RVW MEDS BY RX/DR IN RCRD: CPT | Mod: CPTII,S$GLB,, | Performed by: OPTOMETRIST

## 2022-11-14 PROCEDURE — 1159F MED LIST DOCD IN RCRD: CPT | Mod: CPTII,S$GLB,, | Performed by: OPTOMETRIST

## 2022-11-14 PROCEDURE — 3044F HG A1C LEVEL LT 7.0%: CPT | Mod: CPTII,S$GLB,, | Performed by: OPTOMETRIST

## 2022-11-14 PROCEDURE — 4010F ACE/ARB THERAPY RXD/TAKEN: CPT | Mod: CPTII,S$GLB,, | Performed by: OPTOMETRIST

## 2022-11-14 RX ORDER — TOBRAMYCIN 3 MG/ML
1 SOLUTION/ DROPS OPHTHALMIC 4 TIMES DAILY
Qty: 5 ML | Refills: 0 | Status: SHIPPED | OUTPATIENT
Start: 2022-11-14 | End: 2022-11-21

## 2022-11-14 NOTE — PROGRESS NOTES
HPI     Blurred Vision     Additional comments: Blur ou at near x mos, no assoc pain or red,   constant, no relief over time.            Comments    New pt here for annual eye exam DLS- 01/11/2018    Pt states her vision has changes, finds it easier to take her specs off to   read.   HTN is well controlled on meds. Occasional floaters, only sees FOL when   she has a migraine. Feels like she has constant sleep in her eyes, using   otc GTTS with temporary relief.          Last edited by Pierre Nguyễn, OD on 11/14/2022  1:51 PM.            Assessment /Plan     For exam results, see Encounter Report.    Squamous blepharitis of upper and lower eyelids of both eyes  -     tobramycin sulfate 0.3% (TOBREX) 0.3 % ophthalmic solution; Place 1 drop into both eyes 4 (four) times daily. for 7 days  Dispense: 5 mL; Refill: 0    Nuclear sclerosis of both eyes    Refractive error      Causing mucous, start Tobrex drops 4x/day x 1 week, if better start art tears daily long term for prevention, RTC or call if no better  Educated pt on presence of cataracts and effects on vision. No surgery at this time. Recheck in one year.   3. Spectacle Rx given, discussed different options for glasses. RTC 1 year routine eye exam.

## 2023-04-25 PROBLEM — R07.2 PRECORDIAL PAIN: Status: ACTIVE | Noted: 2023-04-25

## 2023-08-14 ENCOUNTER — HOSPITAL ENCOUNTER (OUTPATIENT)
Dept: RADIOLOGY | Facility: HOSPITAL | Age: 63
Discharge: HOME | End: 2023-08-14
Attending: STUDENT IN AN ORGANIZED HEALTH CARE EDUCATION/TRAINING PROGRAM
Payer: COMMERCIAL

## 2023-08-14 ENCOUNTER — TELEPHONE (OUTPATIENT)
Dept: PHARMACY | Facility: CLINIC | Age: 63
End: 2023-08-14
Payer: COMMERCIAL

## 2023-08-14 ENCOUNTER — OFFICE VISIT (OUTPATIENT)
Dept: RHEUMATOLOGY | Facility: CLINIC | Age: 63
End: 2023-08-14
Payer: COMMERCIAL

## 2023-08-14 VITALS
BODY MASS INDEX: 31.9 KG/M2 | HEIGHT: 69 IN | WEIGHT: 215.38 LBS | DIASTOLIC BLOOD PRESSURE: 68 MMHG | HEART RATE: 60 BPM | SYSTOLIC BLOOD PRESSURE: 140 MMHG

## 2023-08-14 DIAGNOSIS — L40.9 PSORIASIS: ICD-10-CM

## 2023-08-14 DIAGNOSIS — L40.50 PSORIATIC ARTHRITIS: ICD-10-CM

## 2023-08-14 DIAGNOSIS — L40.50 PSORIATIC ARTHRITIS: Primary | ICD-10-CM

## 2023-08-14 PROCEDURE — 3008F PR BODY MASS INDEX (BMI) DOCUMENTED: ICD-10-PCS | Mod: CPTII,S$GLB,, | Performed by: STUDENT IN AN ORGANIZED HEALTH CARE EDUCATION/TRAINING PROGRAM

## 2023-08-14 PROCEDURE — 3044F HG A1C LEVEL LT 7.0%: CPT | Mod: CPTII,S$GLB,, | Performed by: STUDENT IN AN ORGANIZED HEALTH CARE EDUCATION/TRAINING PROGRAM

## 2023-08-14 PROCEDURE — 3078F PR MOST RECENT DIASTOLIC BLOOD PRESSURE < 80 MM HG: ICD-10-PCS | Mod: CPTII,S$GLB,, | Performed by: STUDENT IN AN ORGANIZED HEALTH CARE EDUCATION/TRAINING PROGRAM

## 2023-08-14 PROCEDURE — 73630 X-RAY EXAM OF FOOT: CPT | Mod: 26,LT,, | Performed by: RADIOLOGY

## 2023-08-14 PROCEDURE — 3077F SYST BP >= 140 MM HG: CPT | Mod: CPTII,S$GLB,, | Performed by: STUDENT IN AN ORGANIZED HEALTH CARE EDUCATION/TRAINING PROGRAM

## 2023-08-14 PROCEDURE — 1159F PR MEDICATION LIST DOCUMENTED IN MEDICAL RECORD: ICD-10-PCS | Mod: CPTII,S$GLB,, | Performed by: STUDENT IN AN ORGANIZED HEALTH CARE EDUCATION/TRAINING PROGRAM

## 2023-08-14 PROCEDURE — 3044F PR MOST RECENT HEMOGLOBIN A1C LEVEL <7.0%: ICD-10-PCS | Mod: CPTII,S$GLB,, | Performed by: STUDENT IN AN ORGANIZED HEALTH CARE EDUCATION/TRAINING PROGRAM

## 2023-08-14 PROCEDURE — 1160F PR REVIEW ALL MEDS BY PRESCRIBER/CLIN PHARMACIST DOCUMENTED: ICD-10-PCS | Mod: CPTII,S$GLB,, | Performed by: STUDENT IN AN ORGANIZED HEALTH CARE EDUCATION/TRAINING PROGRAM

## 2023-08-14 PROCEDURE — 1159F MED LIST DOCD IN RCRD: CPT | Mod: CPTII,S$GLB,, | Performed by: STUDENT IN AN ORGANIZED HEALTH CARE EDUCATION/TRAINING PROGRAM

## 2023-08-14 PROCEDURE — 3078F DIAST BP <80 MM HG: CPT | Mod: CPTII,S$GLB,, | Performed by: STUDENT IN AN ORGANIZED HEALTH CARE EDUCATION/TRAINING PROGRAM

## 2023-08-14 PROCEDURE — 73630 X-RAY EXAM OF FOOT: CPT | Mod: TC,50

## 2023-08-14 PROCEDURE — 99999 PR PBB SHADOW E&M-EST. PATIENT-LVL IV: ICD-10-PCS | Mod: PBBFAC,,, | Performed by: STUDENT IN AN ORGANIZED HEALTH CARE EDUCATION/TRAINING PROGRAM

## 2023-08-14 PROCEDURE — 1160F RVW MEDS BY RX/DR IN RCRD: CPT | Mod: CPTII,S$GLB,, | Performed by: STUDENT IN AN ORGANIZED HEALTH CARE EDUCATION/TRAINING PROGRAM

## 2023-08-14 PROCEDURE — 73630 X-RAY EXAM OF FOOT: CPT | Mod: 26,RT,, | Performed by: RADIOLOGY

## 2023-08-14 PROCEDURE — 3008F BODY MASS INDEX DOCD: CPT | Mod: CPTII,S$GLB,, | Performed by: STUDENT IN AN ORGANIZED HEALTH CARE EDUCATION/TRAINING PROGRAM

## 2023-08-14 PROCEDURE — 3077F PR MOST RECENT SYSTOLIC BLOOD PRESSURE >= 140 MM HG: ICD-10-PCS | Mod: CPTII,S$GLB,, | Performed by: STUDENT IN AN ORGANIZED HEALTH CARE EDUCATION/TRAINING PROGRAM

## 2023-08-14 PROCEDURE — 99204 PR OFFICE/OUTPT VISIT, NEW, LEVL IV, 45-59 MIN: ICD-10-PCS | Mod: S$GLB,,, | Performed by: STUDENT IN AN ORGANIZED HEALTH CARE EDUCATION/TRAINING PROGRAM

## 2023-08-14 PROCEDURE — 4010F PR ACE/ARB THEARPY RXD/TAKEN: ICD-10-PCS | Mod: CPTII,S$GLB,, | Performed by: STUDENT IN AN ORGANIZED HEALTH CARE EDUCATION/TRAINING PROGRAM

## 2023-08-14 PROCEDURE — 4010F ACE/ARB THERAPY RXD/TAKEN: CPT | Mod: CPTII,S$GLB,, | Performed by: STUDENT IN AN ORGANIZED HEALTH CARE EDUCATION/TRAINING PROGRAM

## 2023-08-14 PROCEDURE — 99999 PR PBB SHADOW E&M-EST. PATIENT-LVL IV: CPT | Mod: PBBFAC,,, | Performed by: STUDENT IN AN ORGANIZED HEALTH CARE EDUCATION/TRAINING PROGRAM

## 2023-08-14 PROCEDURE — 73630 XR FOOT COMPLETE 3 VIEW BILATERAL: ICD-10-PCS | Mod: 26,RT,, | Performed by: RADIOLOGY

## 2023-08-14 PROCEDURE — 99204 OFFICE O/P NEW MOD 45 MIN: CPT | Mod: S$GLB,,, | Performed by: STUDENT IN AN ORGANIZED HEALTH CARE EDUCATION/TRAINING PROGRAM

## 2023-08-14 NOTE — TELEPHONE ENCOUNTER
Donnie, this is Annie Moreira, clinical pharmacist with [facility] that is part of your care team.  We have begun working on your prescription that your doctor has sent us. Our next steps include:     Working with your insurance company to obtain approval for your medication  Working with you to ensure your medication is affordable     We will be calling you along the way with updates on your medication but if you have any concerns or receive information that you would like to discuss please reach us at [phone].    Welcome call outcome: Patient/caregiver reached.

## 2023-08-16 NOTE — PROGRESS NOTES
RHEUMATOLOGY CLINIC INITIAL VISIT    Reason for consult:- psoriasis    Chief complaints, HPI, ROS, EXAM, Assessment & Plans:-    Lynn Turner is a 62 y.o. pleasant female who presents to be evaluated for psoriasis and psoriatic arthritis.  Patient states she has a lot of pain and stiffness.  Feels it is hard to  things with her hands.  Does state that her pain wakes her up during the night at times.  Has about 20 minutes of morning stiffness.  In general feels her pain is better with activity and worse with rest.  She is taken Celebrex which does not help much.  She has had issues with her nails including pitting in her toenails.  This was previously thought by Podiatry to be related to possible psoriatic arthritis.  She has psoriasis that is worse in the palms and soles of feet.  She has seen Dermatology but has not had treatment for this.  Denies any visual complaints.  No chronic abdominal pain or bloody diarrhea.  No pain at Achilles.  Rheumatologic review of systems otherwise negative.  She has what appears to be psoriasis on her palms.No evidence of synovitis, dactylitis or enthesitis.            Reviewed all available old and outside pertinent medical records.    All lab results personally reviewed and interpreted by me.    1. Psoriatic arthritis    2. Psoriasis        Problem List Items Addressed This Visit          Derm    Psoriasis    Relevant Medications    apremilast (OTEZLA) 30 mg Tab    apremilast (OTEZLA STARTER) 10 mg (4)-20 mg (4)-30 mg (47) DsPk    Other Relevant Orders    HLA B27 Antigen    CBC Auto Differential (Completed)    Comprehensive Metabolic Panel (Completed)    Sedimentation rate (Completed)    C-Reactive Protein (Completed)    X-Ray Foot Complete 3 view Bilateral (Completed)     Other Visit Diagnoses       Psoriatic arthritis    -  Primary    Relevant Medications    apremilast (OTEZLA) 30 mg Tab    apremilast (OTEZLA STARTER) 10 mg (4)-20 mg (4)-30 mg (47) DsPk    Other Relevant  Orders    HLA B27 Antigen    CBC Auto Differential (Completed)    Comprehensive Metabolic Panel (Completed)    Sedimentation rate (Completed)    C-Reactive Protein (Completed)    X-Ray Foot Complete 3 view Bilateral (Completed)            Patient presenting to be evaluated for history of psoriasis and concern for psoriatic arthritis   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    Chronic comorbid conditions affecting medical decision making today:    Past Medical History:   Diagnosis Date    Anticoagulant long-term use     Aortic insufficiency     Arthritis     Bicuspid aortic valve     Carotid stenosis     COPD (chronic obstructive pulmonary disease)     Diabetes mellitus     Headache     Hyperlipidemia     Hypertension     diet controlled    Meniere's disease, unspecified ear     Palpitations     Psoriasis     Sleep apnea     uses cpap    SVT (supraventricular tachycardia)     Tobacco use        Past Surgical History:   Procedure Laterality Date    ANGIOGRAM, CORONARY, WITH LEFT HEART CATHETERIZATION N/A 4/25/2023    Procedure: Left Heart Cath;  Surgeon: Epi Bobo MD;  [facility] CATH;  Service: Cardiology;  Laterality: N/A;    BREAST BIOPSY Right     excisional bx benign    BREAST SURGERY Right     hematoma from trauma    CARDIAC ELECTROPHYSIOLOGY STUDY AND ABLATION  2013    COLONOSCOPY N/A 01/14/2016    Procedure: COLONOSCOPY;  Surgeon: Sushant Gutierrez MD;  Location: [facility] ENDO;  Service: Endoscopy;  Laterality: N/A;    COLONOSCOPY N/A 06/09/2022    Procedure: COLONOSCOPY;  Surgeon: Bakari Mai Jr., MD;  Location: [facility] ENDO;  Service: Endoscopy;  Laterality: N/A;    HYSTERECTOMY  1995    partial    KNEE ARTHROSCOPY Right         Social History      Tobacco Use    Smoking status: Former     Current packs/day: 0.00     Average packs/day: 0.8 packs/day for 46.8 years (35.1 ttl pk-yrs)     Types: Cigarettes     Start date: 1976     Quit date: 10/9/2022     Years since quittin.8    Smokeless tobacco: Never    Tobacco comments:     Pt has stopped & restarted multiple times   Substance Use Topics    Alcohol use: Yes     Comment: occasional    Drug use: No       Family History   Problem Relation Age of Onset    Cataracts Mother     Stroke Mother     Diabetes Mother     Hypertension Mother     Hyperlipidemia Mother     Macular degeneration Father     Cataracts Father     Heart disease Father     Coronary artery disease Father     Diabetes Father     Hypertension Father     Macular degeneration Paternal Uncle        Review of patient's allergies indicates:   Allergen Reactions    Lisinopril Other (See Comments)     uncontrollable cough    Penicillins Hives and Swelling    Erythromycin Hives    Ibuprofen Itching and Rash     Aspirin OK    Tetracyclines Hives       Medication List with Changes/Refills   New Medications    APREMILAST (OTEZLA STARTER) 10 MG (4)-20 MG (4)-30 MG (47) DSPK    Take as directed    APREMILAST (OTEZLA) 30 MG TAB    Take 1 tablet (30 mg total) by mouth 2 (two) times daily.   Current Medications    ASPIRIN 325 MG TABLET    Take 325 mg by mouth once daily.    CELECOXIB (CELEBREX) 100 MG CAPSULE    Take 1 capsule (100 mg total) by mouth once daily.    CHLORTHALIDONE (HYGROTEN) 25 MG TAB    TAKE 1 TABLET BY MOUTH EVERY DAY    LOSARTAN (COZAAR) 100 MG TABLET    Take 1 tablet (100 mg total) by mouth once daily.    METFORMIN (GLUCOPHAGE) 500 MG TABLET    Take 2 tablets (1,000 mg total) by mouth 2 (two) times daily with meals.    ROSUVASTATIN (CRESTOR) 5 MG TABLET    Take 1 tablet (5 mg total) by mouth once daily.    TRAZODONE (DESYREL) 50 MG TABLET    1  tablet approx 2 hours before bed every night    VERAPAMIL (VERELAN) 180 MG C24P    Take 1  capsule (180 mg total) by mouth every morning.    VIT A/VIT C/VIT E/ZINC/COPPER (ICAPS AREDS ORAL)    Take by mouth once daily.         Disclaimer: This note was prepared using voice recognition system and is likely to have sound alike errors and is not proofread.  Please message me with any questions.    45 minutes of total time spent on the encounter, which includes face to face time and non-face to face time preparing to see the patient (eg, review of tests), Obtaining and/or reviewing separately obtained history, Documenting clinical information in the electronic or other health record, Independently interpreting results (not separately reported) and communicating results to the patient/family/caregiver, or Care coordination (not separately reported).     Thank you for allowing me to participate in the care of Lynn Turner.    Jeyson Nick MD

## 2023-08-17 ENCOUNTER — SPECIALTY PHARMACY (OUTPATIENT)
Dept: PHARMACY | Facility: CLINIC | Age: 63
End: 2023-08-17
Payer: COMMERCIAL

## 2023-08-17 NOTE — TELEPHONE ENCOUNTER
Specialty Pharmacy - Initial Clinical Assessment    Specialty Medication Orders Linked to Encounter      Flowsheet Row Most Recent Value   Medication #1 apremilast (OTEZLA) 30 mg Tab (Order#[id number], Rx#[id number])   Medication #2 apremilast (OTEZLA STARTER) 10 mg (4)-20 mg (4)-30 mg (47) DsPk (Order#[id number], Rx#[id number])          Patient Diagnosis   L40.9 - Psoriasis    Subjective    Lynn Turner is a 62 y.o. female, who is followed by the specialty pharmacy service for management and education.    Recent Encounters       Date Type Provider Description    08/17/2023 Specialty Pharmacy Annie Moreira, Laura Initial Clinical Assessment    08/17/2023 Specialty Pharmacy Annie Moreira, Laura Referral Authorization            Current Outpatient Medications   Medication Sig    apremilast (OTEZLA STARTER) 10 mg (4)-20 mg (4)-30 mg (47) DsPk Take as directed    apremilast (OTEZLA) 30 mg Tab Take 1 tablet (30 mg total) by mouth 2 (two) times daily.    aspirin 325 MG tablet Take 325 mg by mouth once daily.    celecoxib (CELEBREX) 100 MG capsule Take 1 capsule (100 mg total) by mouth once daily.    chlorthalidone (HYGROTEN) 25 MG Tab TAKE 1 TABLET BY MOUTH EVERY DAY    losartan (COZAAR) 100 MG tablet Take 1 tablet (100 mg total) by mouth once daily.    metFORMIN (GLUCOPHAGE) 500 MG tablet Take 2 tablets (1,000 mg total) by mouth 2 (two) times daily with meals.    rosuvastatin (CRESTOR) 5 MG tablet Take 1 tablet (5 mg total) by mouth once daily.    traZODone (DESYREL) 50 MG tablet 1  tablet approx 2 hours before bed every night    verapamiL (VERELAN) 180 MG C24P Take 1 capsule (180 mg total) by mouth every morning.    vit A/vit C/vit E/zinc/copper (ICAPS AREDS ORAL) Take by mouth once daily.   Last reviewed on 8/17/2023 11:02 AM by Annie Moreira, PharmD    Review of patient's allergies indicates:   Allergen Reactions    Lisinopril Other (See Comments)     uncontrollable cough    Penicillins Hives and  Swelling    Erythromycin Hives    Ibuprofen Itching and Rash     Aspirin OK    Tetracyclines Hives   Last reviewed on  8/17/2023 11:02 AM by Annie Moreira    Drug Interactions    Drug interactions evaluated: yes  Clinically relevant drug interactions identified: yes   Interactions list: ASA-Celebrex      Drug management plan: Counseled patient on anti-platelet activity of meds    Provided the patient with educational material regarding drug interactions: not applicable               Adverse Effects          Color Change: Pos  Flushing: Pos  Pruritus: Pos  Rash: Pos  Ulcers/sores: Pos  Wound: Pos  Arthralgias: Pos  Gait problem: Pos  Joint swelling: Pos       Assessment Questions - Documented Responses      Flowsheet Row Most Recent Value   Assessment    Medication Reconciliation completed for patient Yes   During the past 4 weeks, has patient missed any activities due to condition or medication? No   During the past 4 weeks, did patient have any of the following urgent care visits? None   Goals of Therapy Status Discussed (new start)   Status of the patients ability to self-administer: Is Able   All education points have been covered with patient? Yes, supplemental printed education provided   Welcome packet contents reviewed and discussed with patient? Yes   Assesment completed? Yes   Plan Therapy being initiated   Do you need to open a clinical intervention (i-vent)? No   Do you want to schedule first shipment? Yes          Refill Questions - Documented Responses      Flowsheet Row Most Recent Value   Patient Availability and HIPAA Verification    Does patient want to proceed with activity? Yes   HIPAA/medical authority confirmed? Yes   Relationship to patient of person spoken to? Self   Refill Screening Questions    When does the patient need to receive the medication? 08/18/23   Refill Delivery Questions    How will the patient receive the medication? MED[facility]   When does the patient need to receive the  "medication? 08/18/23   Shipping Address Home   Address in [facility] confirmed and updated if neccessary? Yes   Expected Copay ($) 0   Is the patient able to afford the medication copay? Yes   Payment Method zero copay   Days supply of Refill 28   Supplies needed? No supplies needed   Refill activity completed? Yes   Refill activity plan Refill scheduled   Shipment/Pickup Date: 08/17/23            Objective    She has a past medical history of Anticoagulant long-term use, Aortic insufficiency, Arthritis, Bicuspid aortic valve, Carotid stenosis, COPD (chronic obstructive pulmonary disease), Diabetes mellitus, Headache, Hyperlipidemia, Hypertension, Meniere's disease, unspecified ear, Palpitations, Psoriasis, Sleep apnea, SVT (supraventricular tachycardia), and Tobacco use.    Tried/failed medications: NSAIDs, steroids, topical clobetasol, UV     BP Readings from Last 4 Encounters:   08/14/23 (!) 140/68   06/22/23 129/75   05/17/23 (!) 148/72   04/25/23 132/70     Ht Readings from Last 4 Encounters:   08/14/23 5' 9" (1.753 m)   06/22/23 5' 9" (1.753 m)   05/17/23 5' 9" (1.753 m)   04/25/23 5' 9" (1.753 m)     Wt Readings from Last 4 Encounters:   08/14/23 97.7 kg (215 lb 6.2 oz)   06/22/23 97.7 kg (215 lb 6.4 oz)   05/17/23 98.4 kg (217 lb)   04/25/23 97.5 kg (215 lb)       The goals of prescribed drug therapy management include:  Supporting patient to meet the prescriber's medical treatment objectives  Improving or maintaining quality of life  Maintaining optimal therapy adherence  Minimizing and managing side effects      Goals of Therapy Status: Discussed (new start)    Assessment/Plan  Patient plans to start therapy on 08/18/23      Indication, dosage, appropriateness, effectiveness, safety and convenience of her specialty medication(s) were reviewed today.     Patient Education   Patient received education on the following:   Expectations and possible outcomes of therapy  Proper use, timely administration, " and missed dose management  Duration of therapy  Side effects, including prevention, minimization, and management  Contraindications and safety precautions  New or changed medications, including prescribe and over the counter medications and supplements  Reviews recommended vaccinations, as appropriate  Storage, safe handling, and disposal    Patient educated on all topics and expressed understanding with no further questions.     Tasks added this encounter   5/17/2024 - Clinical Assessment (1 year recurrence)   Tasks due within next 3 months   No tasks due.     Annie Moreira, PharmD  [facility]  [address] [facility] [address]  Phone: [phone]  Fax: [phone]

## 2023-09-29 PROBLEM — Z79.899 DRUG-INDUCED IMMUNODEFICIENCY: Status: ACTIVE | Noted: 2023-09-29

## 2023-09-29 PROBLEM — R73.03 PREDIABETES: Status: ACTIVE | Noted: 2023-09-29

## 2023-09-29 PROBLEM — D84.821 DRUG-INDUCED IMMUNODEFICIENCY: Status: ACTIVE | Noted: 2023-09-29

## 2023-11-20 ENCOUNTER — OFFICE VISIT (OUTPATIENT)
Dept: RHEUMATOLOGY | Facility: CLINIC | Age: 63
End: 2023-11-20
Payer: COMMERCIAL

## 2023-11-20 VITALS
BODY MASS INDEX: 30.21 KG/M2 | HEIGHT: 69 IN | SYSTOLIC BLOOD PRESSURE: 119 MMHG | HEART RATE: 67 BPM | WEIGHT: 204 LBS | DIASTOLIC BLOOD PRESSURE: 71 MMHG

## 2023-11-20 DIAGNOSIS — L40.9 PSORIASIS: Primary | ICD-10-CM

## 2023-11-20 PROCEDURE — 3044F HG A1C LEVEL LT 7.0%: CPT | Mod: CPTII,S$GLB,, | Performed by: STUDENT IN AN ORGANIZED HEALTH CARE EDUCATION/TRAINING PROGRAM

## 2023-11-20 PROCEDURE — 3008F PR BODY MASS INDEX (BMI) DOCUMENTED: ICD-10-PCS | Mod: CPTII,S$GLB,, | Performed by: STUDENT IN AN ORGANIZED HEALTH CARE EDUCATION/TRAINING PROGRAM

## 2023-11-20 PROCEDURE — 99999 PR PBB SHADOW E&M-EST. PATIENT-LVL IV: ICD-10-PCS | Mod: PBBFAC,,, | Performed by: STUDENT IN AN ORGANIZED HEALTH CARE EDUCATION/TRAINING PROGRAM

## 2023-11-20 PROCEDURE — 3078F DIAST BP <80 MM HG: CPT | Mod: CPTII,S$GLB,, | Performed by: STUDENT IN AN ORGANIZED HEALTH CARE EDUCATION/TRAINING PROGRAM

## 2023-11-20 PROCEDURE — 3074F PR MOST RECENT SYSTOLIC BLOOD PRESSURE < 130 MM HG: ICD-10-PCS | Mod: CPTII,S$GLB,, | Performed by: STUDENT IN AN ORGANIZED HEALTH CARE EDUCATION/TRAINING PROGRAM

## 2023-11-20 PROCEDURE — 1159F PR MEDICATION LIST DOCUMENTED IN MEDICAL RECORD: ICD-10-PCS | Mod: CPTII,S$GLB,, | Performed by: STUDENT IN AN ORGANIZED HEALTH CARE EDUCATION/TRAINING PROGRAM

## 2023-11-20 PROCEDURE — 3074F SYST BP LT 130 MM HG: CPT | Mod: CPTII,S$GLB,, | Performed by: STUDENT IN AN ORGANIZED HEALTH CARE EDUCATION/TRAINING PROGRAM

## 2023-11-20 PROCEDURE — 99999 PR PBB SHADOW E&M-EST. PATIENT-LVL IV: CPT | Mod: PBBFAC,,, | Performed by: STUDENT IN AN ORGANIZED HEALTH CARE EDUCATION/TRAINING PROGRAM

## 2023-11-20 PROCEDURE — 4010F PR ACE/ARB THEARPY RXD/TAKEN: ICD-10-PCS | Mod: CPTII,S$GLB,, | Performed by: STUDENT IN AN ORGANIZED HEALTH CARE EDUCATION/TRAINING PROGRAM

## 2023-11-20 PROCEDURE — 99214 PR OFFICE/OUTPT VISIT, EST, LEVL IV, 30-39 MIN: ICD-10-PCS | Mod: S$GLB,,, | Performed by: STUDENT IN AN ORGANIZED HEALTH CARE EDUCATION/TRAINING PROGRAM

## 2023-11-20 PROCEDURE — 1159F MED LIST DOCD IN RCRD: CPT | Mod: CPTII,S$GLB,, | Performed by: STUDENT IN AN ORGANIZED HEALTH CARE EDUCATION/TRAINING PROGRAM

## 2023-11-20 PROCEDURE — 3078F PR MOST RECENT DIASTOLIC BLOOD PRESSURE < 80 MM HG: ICD-10-PCS | Mod: CPTII,S$GLB,, | Performed by: STUDENT IN AN ORGANIZED HEALTH CARE EDUCATION/TRAINING PROGRAM

## 2023-11-20 PROCEDURE — 1160F RVW MEDS BY RX/DR IN RCRD: CPT | Mod: CPTII,S$GLB,, | Performed by: STUDENT IN AN ORGANIZED HEALTH CARE EDUCATION/TRAINING PROGRAM

## 2023-11-20 PROCEDURE — 99214 OFFICE O/P EST MOD 30 MIN: CPT | Mod: S$GLB,,, | Performed by: STUDENT IN AN ORGANIZED HEALTH CARE EDUCATION/TRAINING PROGRAM

## 2023-11-20 PROCEDURE — 4010F ACE/ARB THERAPY RXD/TAKEN: CPT | Mod: CPTII,S$GLB,, | Performed by: STUDENT IN AN ORGANIZED HEALTH CARE EDUCATION/TRAINING PROGRAM

## 2023-11-20 PROCEDURE — 3044F PR MOST RECENT HEMOGLOBIN A1C LEVEL <7.0%: ICD-10-PCS | Mod: CPTII,S$GLB,, | Performed by: STUDENT IN AN ORGANIZED HEALTH CARE EDUCATION/TRAINING PROGRAM

## 2023-11-20 PROCEDURE — 1160F PR REVIEW ALL MEDS BY PRESCRIBER/CLIN PHARMACIST DOCUMENTED: ICD-10-PCS | Mod: CPTII,S$GLB,, | Performed by: STUDENT IN AN ORGANIZED HEALTH CARE EDUCATION/TRAINING PROGRAM

## 2023-11-20 PROCEDURE — 3008F BODY MASS INDEX DOCD: CPT | Mod: CPTII,S$GLB,, | Performed by: STUDENT IN AN ORGANIZED HEALTH CARE EDUCATION/TRAINING PROGRAM

## 2023-11-20 RX ORDER — CLOBETASOL PROPIONATE 0.5 MG/G
OINTMENT TOPICAL 2 TIMES DAILY
Qty: 60 G | Refills: 0 | Status: SHIPPED | OUTPATIENT
Start: 2023-11-20

## 2023-11-20 NOTE — PROGRESS NOTES
RHEUMATOLOGY CLINIC ESTABLISHED PATIENT VISIT    Reason for consult:- psoriatic arthritis; plaque psoriasis    Chief complaints, HPI, ROS, EXAM, Assessment & Plans:-    Lynn Turner is a 63 y.o. pleasant female who presents to follow-up from her initial visit with us for psoriatic arthritis and plaque psoriasis.  At that visit we started her on Otezla.  She had most significantly palmar plantar psoriasis.  She is had very good improvement of this since starting Otezla.  She also notices improved function of her hands and less stiffness.  No significantly swollen or tender joints.  No dactylitis.  No enthesopathy symptoms.  No visual changes.  No abdominal pain.  She does note new rash in inframammary region.  Rheumatologic review of systems otherwise negative.  Shiny Skin rash appearing in inframammary region.  Improvement of palmar plantar psoriasis.  No dactylitis, synovitis or enthesitis.    Reviewed all available old and outside pertinent medical records.    All lab results personally reviewed and interpreted by me.    1. Psoriasis        Problem List Items Addressed This Visit          Derm    Psoriasis - Primary    Relevant Medications    clobetasol 0.05% (TEMOVATE) 0.05 % Oint    Other Relevant Orders    CBC Auto Differential    Comprehensive Metabolic Panel    Sedimentation rate    C-Reactive Protein    Ambulatory referral/consult to Dermatology       Patient following up today for psoriasis and psoriatic arthritis   She is had good improvement of her palmar plantar psoriasis as well as joint pain/stiffness since starting Otezla   Rash in intertriginous inframammary area seems consistent with inverse psoriasis possibly  Recommend short course of topical corticosteroids  Referral to Dermatology for further evaluation if not cleared with this  Drug therapy requiring intensive monitoring for toxicity  High Risk Medication Monitoring encounter  No current medication related issues, no evidence of toxicity  I  ordered labs for toxicity monitoring, have personally reviewed the findings, and discussed them with the patient.  Pending labs will be sent via the portal  Compromised immune system secondary to autoimmune disease and/or use of immunosuppressive drugs.  Monitor carefully for infections.  Advised patient to get immediate medical care if any infection arises.  Also advised strict adherence age-appropriate vaccinations and cancer screenings with PCP.  Patient advised to hold DMARD and/or biologic therapy for signs of infection or for surgery. If you are unsure what to do please call our office for instruction.[facility] [phone]      # Follow up in about 4 months (around 3/20/2024).    Chronic comorbid conditions affecting medical decision making today:    Past Medical History:   Diagnosis Date    Anticoagulant long-term use     Aortic insufficiency     Arthritis     Bicuspid aortic valve     Carotid stenosis     COPD (chronic obstructive pulmonary disease)     Diabetes mellitus     Headache     Hyperlipidemia     Hypertension     diet controlled    Meniere's disease, unspecified ear     Palpitations     Psoriasis     Sleep apnea     uses cpap    SVT (supraventricular tachycardia)     Tobacco use        Past Surgical History:   Procedure Laterality Date    ANGIOGRAM, CORONARY, WITH LEFT HEART CATHETERIZATION N/A 4/25/2023    Procedure: Left Heart Cath;  Surgeon: Epi Bobo MD;  Location: [facility] CATH;  Service: Cardiology;  Laterality: N/A;    BREAST BIOPSY Right     excisional bx benign    BREAST SURGERY Right     hematoma from trauma    CARDIAC ELECTROPHYSIOLOGY STUDY AND ABLATION  2013    COLONOSCOPY N/A 01/14/2016    Procedure: COLONOSCOPY;  Surgeon: Sushant Gutierrez MD;  Location: [facility];  Service: Endoscopy;  Laterality: N/A;    COLONOSCOPY N/A 06/09/2022    Procedure: COLONOSCOPY;  Surgeon: Bakari Mai Jr., MD;  Location: [facility] ENDO;  Service: Endoscopy;  Laterality: N/A;     HYSTERECTOMY      partial    KNEE ARTHROSCOPY Right         Social History     Tobacco Use    Smoking status: Former     Current packs/day: 0.00     Average packs/day: 0.8 packs/day for 46.8 years (35.1 ttl pk-yrs)     Types: Cigarettes     Start date: 1976     Quit date: 10/9/2022     Years since quittin.1    Smokeless tobacco: Never    Tobacco comments:     Pt has stopped & restarted multiple times   Substance Use Topics    Alcohol use: Yes     Comment: occasional    Drug use: No       Family History   Problem Relation Age of Onset    Cataracts Mother     Stroke Mother     Diabetes Mother     Hypertension Mother     Hyperlipidemia Mother     Macular degeneration Father     Cataracts Father     Heart disease Father     Coronary artery disease Father     Diabetes Father     Hypertension Father     Macular degeneration Paternal Uncle        Review of patient's allergies indicates:   Allergen Reactions    Lisinopril Other (See Comments)     uncontrollable cough    Penicillins Hives and Swelling    Erythromycin Hives    Ibuprofen Itching and Rash     Aspirin OK    Tetracyclines Hives       Medication List with Changes/Refills   New Medications    CLOBETASOL 0.05% (TEMOVATE) 0.05 % OINT    Apply topically 2 (two) times daily.   Current Medications    APREMILAST (OTEZLA) 30 MG TAB    Take 1 tablet (30 mg total) by mouth 2 (two) times daily.    ASPIRIN 325 MG TABLET    Take 325 mg by mouth once daily.    CELECOXIB (CELEBREX) 100 MG CAPSULE    Take 1 capsule (100 mg total) by mouth once daily.    CHLORTHALIDONE (HYGROTEN) 25 MG TAB    Take 1 tablet (25 mg total) by mouth once daily.    LOSARTAN (COZAAR) 100 MG TABLET    Take 1 tablet (100 mg total) by mouth once daily.    METFORMIN (GLUCOPHAGE) 500 MG TABLET    Take 2 tablets (1,000 mg total) by mouth 2 (two) times daily with meals.    ROSUVASTATIN (CRESTOR) 5 MG TABLET    Take 1 tablet (5 mg total) by mouth once daily.    TRAZODONE (DESYREL) 50 MG TABLET     Take 1 tablet (50 mg total) by mouth every evening. TAKE 1 TABLET BY MOUTH TWO HOURS BEFORE BEDTIME    VERAPAMIL (VERELAN) 180 MG C24P    Take 1 capsule (180 mg total) by mouth every morning.    VIT A/VIT C/VIT E/ZINC/COPPER (ICAPS AREDS ORAL)    Take by mouth once daily.         Disclaimer: This note was prepared using voice recognition system and is likely to have sound alike errors and is not proofread.  Please message me with any questions.    32 minutes of total time spent on the encounter, which includes face to face time and non-face to face time preparing to see the patient (eg, review of tests), Obtaining and/or reviewing separately obtained history, Documenting clinical information in the electronic or other health record, Independently interpreting results (not separately reported) and communicating results to the patient/family/caregiver, or Care coordination (not separately reported).     Thank you for allowing me to participate in the care of Lynn Turner.    Jeyson Nick MD

## 2023-11-30 ENCOUNTER — OFFICE VISIT (OUTPATIENT)
Dept: URGENT CARE | Facility: CLINIC | Age: 63
End: 2023-11-30
Payer: COMMERCIAL

## 2023-11-30 VITALS
DIASTOLIC BLOOD PRESSURE: 80 MMHG | WEIGHT: 204 LBS | TEMPERATURE: 98 F | RESPIRATION RATE: 16 BRPM | OXYGEN SATURATION: 98 % | HEIGHT: 69 IN | BODY MASS INDEX: 30.21 KG/M2 | HEART RATE: 68 BPM | SYSTOLIC BLOOD PRESSURE: 167 MMHG

## 2023-11-30 DIAGNOSIS — M54.6 RIGHT-SIDED THORACIC BACK PAIN, UNSPECIFIED CHRONICITY: Primary | ICD-10-CM

## 2023-11-30 PROCEDURE — 96372 THER/PROPH/DIAG INJ SC/IM: CPT | Mod: S$GLB,,, | Performed by: PHYSICIAN ASSISTANT

## 2023-11-30 PROCEDURE — 99213 OFFICE O/P EST LOW 20 MIN: CPT | Mod: 25,S$GLB,, | Performed by: PHYSICIAN ASSISTANT

## 2023-11-30 PROCEDURE — 99213 PR OFFICE/OUTPT VISIT, EST, LEVL III, 20-29 MIN: ICD-10-PCS | Mod: 25,S$GLB,, | Performed by: PHYSICIAN ASSISTANT

## 2023-11-30 PROCEDURE — 71046 X-RAY EXAM CHEST 2 VIEWS: CPT | Mod: S$GLB,,, | Performed by: RADIOLOGY

## 2023-11-30 PROCEDURE — 96372 PR INJECTION,THERAP/PROPH/DIAG2ST, IM OR SUBCUT: ICD-10-PCS | Mod: S$GLB,,, | Performed by: PHYSICIAN ASSISTANT

## 2023-11-30 PROCEDURE — 71046 XR CHEST PA AND LATERAL: ICD-10-PCS | Mod: S$GLB,,, | Performed by: RADIOLOGY

## 2023-11-30 RX ORDER — NAPROXEN 500 MG/1
500 TABLET ORAL 2 TIMES DAILY
Qty: 20 TABLET | Refills: 0 | Status: SHIPPED | OUTPATIENT
Start: 2023-11-30 | End: 2023-12-10

## 2023-11-30 RX ORDER — DEXAMETHASONE SODIUM PHOSPHATE 100 MG/10ML
10 INJECTION INTRAMUSCULAR; INTRAVENOUS
Status: COMPLETED | OUTPATIENT
Start: 2023-11-30 | End: 2023-11-30

## 2023-11-30 RX ADMIN — DEXAMETHASONE SODIUM PHOSPHATE 10 MG: 100 INJECTION INTRAMUSCULAR; INTRAVENOUS at 10:11

## 2023-11-30 NOTE — PROGRESS NOTES
"Subjective:      Patient ID: Lynn Turner is a 63 y.o. female.    Vitals:  height is 5' 9" (1.753 m) and weight is 92.5 kg (204 lb). Her temporal temperature is 97.9 °F (36.6 °C). Her blood pressure is 167/80 (abnormal) and her pulse is 68. Her respiration is 16 and oxygen saturation is 98%.     Chief Complaint: Back Pain    Pt presents to urgent care with back pain.  She states that on Wednesday of last week she hurt her back moving a table.  She states that she got a muscle relaxer from her primary on Tuesday.  The muscle relaxer seemed to help but this morning she woke up and feels like the pain is right back to where it was before she started to take the muscle relaxers.     Back Pain  This is a new problem. The current episode started 1 to 4 weeks ago. The problem occurs constantly. The problem is unchanged. The pain is present in the costovertebral angle. The pain does not radiate. The pain is at a severity of 10/10. The pain is severe. The symptoms are aggravated by bending, sitting, standing, twisting and lying down. Pertinent negatives include no abdominal pain, chest pain, fever or headaches. Treatments tried: muscle relaxers. The treatment provided mild relief.       Constitution: Negative for chills, sweating, fatigue and fever.   HENT:  Negative for ear pain, drooling, congestion, sore throat, trouble swallowing and voice change.    Neck: Negative for neck pain, neck stiffness, painful lymph nodes and neck swelling.   Cardiovascular:  Negative for chest pain, leg swelling, palpitations, sob on exertion and passing out.   Eyes:  Negative for eye pain, eye redness, photophobia, double vision, blurred vision and eyelid swelling.   Respiratory:  Positive for shortness of breath. Negative for chest tightness, cough, sputum production, bloody sputum, stridor and wheezing.    Gastrointestinal:  Negative for abdominal pain, abdominal bloating, nausea, vomiting, constipation, diarrhea and heartburn. "   Musculoskeletal:  Positive for back pain and muscle ache. Negative for joint pain, joint swelling, abnormal ROM of joint and muscle cramps.   Skin:  Negative for rash and hives.   Allergic/Immunologic: Negative for seasonal allergies, food allergies, hives, itching and sneezing.   Neurological:  Negative for dizziness, light-headedness, passing out, loss of balance, headaches, altered mental status, loss of consciousness and seizures.   Hematologic/Lymphatic: Negative for swollen lymph nodes.   Psychiatric/Behavioral:  Negative for altered mental status and nervous/anxious. The patient is not nervous/anxious.       Objective:     Physical Exam   Constitutional: She is oriented to person, place, and time. She appears well-developed. She is cooperative.   HENT:   Head: Normocephalic and atraumatic.   Ears:   Right Ear: Hearing, tympanic membrane, external ear and ear canal normal.   Left Ear: Hearing, tympanic membrane, external ear and ear canal normal.   Nose: Nose normal. No mucosal edema or nasal deformity. No epistaxis. Right sinus exhibits no maxillary sinus tenderness and no frontal sinus tenderness. Left sinus exhibits no maxillary sinus tenderness and no frontal sinus tenderness.   Mouth/Throat: Uvula is midline, oropharynx is clear and moist and mucous membranes are normal. No trismus in the jaw. Normal dentition. No uvula swelling.   Eyes: Conjunctivae and lids are normal.   Neck: Trachea normal and phonation normal. Neck supple.   Cardiovascular: Normal rate, regular rhythm, normal heart sounds and normal pulses.   Pulmonary/Chest: Effort normal and breath sounds normal.   Abdominal: Normal appearance and bowel sounds are normal. Soft.   Musculoskeletal: Normal range of motion.         General: Tenderness present. Normal range of motion.      Comments: + reproducible pain to right upper thoracic back. No midline involvement. 5/5 strength  FROM   Neurological: She is alert and oriented to person, place,  and time. She exhibits normal muscle tone.   Skin: Skin is warm, dry and intact.   Psychiatric: Her speech is normal and behavior is normal. Judgment and thought content normal.   Nursing note and vitals reviewed.      Assessment:     1. Right-sided thoracic back pain, unspecified chronicity        Plan:   Continue Robaxin. Heating pad and stretches    Right-sided thoracic back pain, unspecified chronicity  -     XR CHEST PA AND LATERAL; Future; Expected date: 11/30/2023    Other orders  -     naproxen (NAPROSYN) 500 MG tablet; Take 1 tablet (500 mg total) by mouth 2 (two) times daily. for 10 days  Dispense: 20 tablet; Refill: 0  -     dexAMETHasone injection 10 mg      Patient Instructions     You must understand that you've received an Urgent Care treatment only and that you may be released before all your medical problems are known or treated. You, the patient, will arrange for follow up care as instructed.  Follow up with your PCP or specialty clinic as directed if not improved or as needed. You can call [phone] to schedule an appointment with the appropriate provider.  If your condition worsens we recommend that you receive another evaluation at the Emergency Department for any concerns or worsening of condition.  Patient aware and verbalized understanding.

## 2023-11-30 NOTE — PATIENT INSTRUCTIONS
You must understand that you've received an Urgent Care treatment only and that you may be released before all your medical problems are known or treated. You, the patient, will arrange for follow up care as instructed.  Follow up with your PCP or specialty clinic as directed if not improved or as needed. You can call [phone] to schedule an appointment with the appropriate provider.  If your condition worsens we recommend that you receive another evaluation at the Emergency Department for any concerns or worsening of condition.  Patient aware and verbalized understanding.

## 2024-01-04 DIAGNOSIS — L40.50 PSORIATIC ARTHRITIS: ICD-10-CM

## 2024-01-04 DIAGNOSIS — L40.9 PSORIASIS: ICD-10-CM

## 2024-01-05 ENCOUNTER — PATIENT MESSAGE (OUTPATIENT)
Dept: RHEUMATOLOGY | Facility: CLINIC | Age: 64
End: 2024-01-05
Payer: COMMERCIAL

## 2024-01-05 RX ORDER — APREMILAST 30 MG/1
30 TABLET, FILM COATED ORAL 2 TIMES DAILY
Qty: 60 TABLET | Refills: 3 | Status: ACTIVE | OUTPATIENT
Start: 2024-01-05

## 2024-02-14 ENCOUNTER — OFFICE VISIT (OUTPATIENT)
Dept: PAIN MEDICINE | Facility: CLINIC | Age: 64
End: 2024-02-14
Payer: COMMERCIAL

## 2024-02-14 VITALS
DIASTOLIC BLOOD PRESSURE: 70 MMHG | BODY MASS INDEX: 31.1 KG/M2 | HEIGHT: 69 IN | HEART RATE: 61 BPM | WEIGHT: 210 LBS | SYSTOLIC BLOOD PRESSURE: 162 MMHG

## 2024-02-14 DIAGNOSIS — M54.14 THORACIC RADICULOPATHY: Primary | ICD-10-CM

## 2024-02-14 DIAGNOSIS — M62.830 BACK SPASM: ICD-10-CM

## 2024-02-14 DIAGNOSIS — M54.6 PAIN IN THORACIC SPINE: ICD-10-CM

## 2024-02-14 PROCEDURE — 3008F BODY MASS INDEX DOCD: CPT | Mod: CPTII,S$GLB,, | Performed by: ANESTHESIOLOGY

## 2024-02-14 PROCEDURE — 99999 PR PBB SHADOW E&M-EST. PATIENT-LVL IV: CPT | Mod: PBBFAC,,, | Performed by: ANESTHESIOLOGY

## 2024-02-14 PROCEDURE — 3078F DIAST BP <80 MM HG: CPT | Mod: CPTII,S$GLB,, | Performed by: ANESTHESIOLOGY

## 2024-02-14 PROCEDURE — 3077F SYST BP >= 140 MM HG: CPT | Mod: CPTII,S$GLB,, | Performed by: ANESTHESIOLOGY

## 2024-02-14 PROCEDURE — 1159F MED LIST DOCD IN RCRD: CPT | Mod: CPTII,S$GLB,, | Performed by: ANESTHESIOLOGY

## 2024-02-14 PROCEDURE — 1160F RVW MEDS BY RX/DR IN RCRD: CPT | Mod: CPTII,S$GLB,, | Performed by: ANESTHESIOLOGY

## 2024-02-14 PROCEDURE — 99204 OFFICE O/P NEW MOD 45 MIN: CPT | Mod: S$GLB,,, | Performed by: ANESTHESIOLOGY

## 2024-02-14 NOTE — PROGRESS NOTES
[facility] New Patient Evaluation      Referred by: Dr. Jeaneth Oro    PCP:     CC:   Chief Complaint   Patient presents with    Mid-back Pain          2/14/2024     8:27 AM   Last 3 PDI Scores   Pain Disability Index (PDI) 20         HPI:   Lynn Turner is a 63 y.o. female patient who has a past medical history of Anticoagulant long-term use, Aortic insufficiency, Arthritis, Bicuspid aortic valve, Carotid stenosis, COPD (chronic obstructive pulmonary disease), Diabetes mellitus, Headache, Hyperlipidemia, Hypertension, Meniere's disease, unspecified ear, Palpitations, Psoriasis, Sleep apnea, SVT (supraventricular tachycardia), and Tobacco use. She presents with back pain.  Bothering her for the past 3 months.  She reports that she was bending over to carry a table down a flight of stairs when she felt a pop in her midthoracic back.  Since that time she has been having midthoracic back pain that radiates towards the right greater than left.  The pain is constant, aching, grabbing, tight that radiates between her shoulder blades and around towards her ribs.  Pain is worse with sitting, standing, walking, bending, coughing, lifting and relieved with lying down and heat.  She denies any numbness or weakness.  She denies any bilateral bladder dysfunction.      Pain Intervention History:      Past Spine Surgical History:      Past and current medications:  Antineuropathics:  NSAIDs: celebrex   Physical therapy: yes, completed   Antidepressants:  Muscle relaxers: tizanidine   Opioids:  Antiplatelets/Anticoagulants: aspirin     History:    Current Outpatient Medications:     apremilast (OTEZLA) 30 mg Tab, Take 1 tablet (30 mg total) by mouth 2 (two) times daily., Disp: 60 tablet, Rfl: 3    aspirin 325 MG tablet, Take 325 mg by mouth once daily., Disp: , Rfl:     celecoxib (CELEBREX) 100 MG capsule, Take 1 capsule (100 mg total) by mouth 2 (two) times daily., Disp: 180 capsule, Rfl: 2    chlorthalidone  (HYGROTEN) 25 MG Tab, Take 1 tablet (25 mg total) by mouth once daily., Disp: 90 tablet, Rfl: 3    clobetasol 0.05% (TEMOVATE) 0.05 % Oint, Apply topically 2 (two) times daily., Disp: 60 g, Rfl: 0    LIDOcaine (LIDODERM) 5 %, Place 1 patch onto the skin once daily. Remove & Discard patch within 12 hours or as directed by MD, Disp: 14 patch, Rfl: 3    LORazepam (ATIVAN) 0.5 MG tablet, Take 1 tablet (0.5 mg total) by mouth every 8 (eight) hours as needed for Anxiety., Disp: 14 tablet, Rfl: 0    losartan (COZAAR) 100 MG tablet, Take 1 tablet (100 mg total) by mouth once daily., Disp: 90 tablet, Rfl: 3    metFORMIN (GLUCOPHAGE) 500 MG tablet, Take 2 tablets (1,000 mg total) by mouth 2 (two) times daily with meals., Disp: 360 tablet, Rfl: 3    rosuvastatin (CRESTOR) 5 MG tablet, Take 1 tablet (5 mg total) by mouth once daily., Disp: 90 tablet, Rfl: 3    tiZANidine (ZANAFLEX) 4 MG tablet, Take 1 tablet (4 mg total) by mouth every 6 (six) hours as needed., Disp: 42 tablet, Rfl: 1    traZODone (DESYREL) 50 MG tablet, Take 1 tablet (50 mg total) by mouth every evening. TAKE 1 TABLET BY MOUTH TWO HOURS BEFORE BEDTIME, Disp: 90 tablet, Rfl: 3    verapamiL (VERELAN) 180 MG C24P, Take 1 capsule (180 mg total) by mouth every morning., Disp: 90 capsule, Rfl: 3    vit A/vit C/vit E/zinc/copper (ICAPS AREDS ORAL), Take by mouth once daily., Disp: , Rfl:     Past Medical History:   Diagnosis Date    Anticoagulant long-term use     Aortic insufficiency     Arthritis     Bicuspid aortic valve     Carotid stenosis     COPD (chronic obstructive pulmonary disease)     Diabetes mellitus     Headache     Hyperlipidemia     Hypertension     diet controlled    Meniere's disease, unspecified ear     Palpitations     Psoriasis     Sleep apnea     uses cpap    SVT (supraventricular tachycardia)     Tobacco use        Past Surgical History:   Procedure Laterality Date    ANGIOGRAM, CORONARY, WITH LEFT HEART CATHETERIZATION N/A 4/25/2023     Procedure: Left Heart Cath;  Surgeon: Epi Bobo MD;  Location: [facility] CATH;  Service: Cardiology;  Laterality: N/A;    BREAST BIOPSY Right     excisional bx benign    BREAST SURGERY Right     hematoma from trauma    CARDIAC ELECTROPHYSIOLOGY STUDY AND ABLATION      COLONOSCOPY N/A 2016    Procedure: COLONOSCOPY;  Surgeon: Sushant Gutierrez MD;  Location: [facility] ENDO;  Service: Endoscopy;  Laterality: N/A;    COLONOSCOPY N/A 2022    Procedure: COLONOSCOPY;  Surgeon: Bakari Mai Jr., MD;  Location: [facility] ENDO;  Service: Endoscopy;  Laterality: N/A;    HYSTERECTOMY      partial    KNEE ARTHROSCOPY Right        Family History   Problem Relation Age of Onset    Cataracts Mother     Stroke Mother     Diabetes Mother     Hypertension Mother     Hyperlipidemia Mother     Macular degeneration Father     Cataracts Father     Heart disease Father     Coronary artery disease Father     Diabetes Father     Hypertension Father     Macular degeneration Paternal Uncle        Social History     Socioeconomic History    Marital status:     Number of children: 4   Occupational History    Occupation: retired   Tobacco Use    Smoking status: Former     Current packs/day: 0.00     Average packs/day: 0.8 packs/day for 46.8 years (35.1 ttl pk-yrs)     Types: Cigarettes     Start date: 1976     Quit date: 10/9/2022     Years since quittin.3    Smokeless tobacco: Never    Tobacco comments:     Pt has stopped & restarted multiple times   Substance and Sexual Activity    Alcohol use: Yes     Comment: occasional    Drug use: No    Sexual activity: Yes     Partners: Male     Social Determinants of Health     Financial Resource Strain: Low Risk  (2023)    Overall Financial Resource Strain (CARDIA)     Difficulty of Paying Living Expenses: Not very hard   Food Insecurity: No Food Insecurity (2023)    Hunger Vital Sign     Worried About Running Out of Food in the Last Year: Never true     Ran Out  "of Food in the Last Year: Never true   Transportation Needs: No Transportation Needs (12/12/2023)    PRAPARE - Transportation     Lack of Transportation (Medical): No     Lack of Transportation (Non-Medical): No   Physical Activity: Sufficiently Active (12/12/2023)    Exercise Vital Sign     Days of Exercise per Week: 5 days     Minutes of Exercise per Session: 30 min   Recent Concern: Physical Activity - Insufficiently Active (9/27/2023)    Exercise Vital Sign     Days of Exercise per Week: 3 days     Minutes of Exercise per Session: 30 min   Stress: No Stress Concern Present (12/12/2023)    [employer] [place] [employer] - Occupational Stress Questionnaire     Feeling of Stress : Not at all   Social Connections: Unknown (12/12/2023)    Social Connection and Isolation Panel [NHANES]     Frequency of Communication with Friends and Family: More than three times a week     Frequency of Social Gatherings with Friends and Family: Once a week     Active Member of Clubs or Organizations: No     Attends Club or Organization Meetings: Never     Marital Status:    Housing Stability: Low Risk  (12/12/2023)    Housing Stability Vital Sign     Unable to Pay for Housing in the Last Year: No     Number of Places Lived in the Last Year: 1     Unstable Housing in the Last Year: No       Review of patient's allergies indicates:   Allergen Reactions    Lisinopril Other (See Comments)     uncontrollable cough    Penicillins Hives and Swelling    Erythromycin Hives    Ibuprofen Itching and Rash     Aspirin OK    Tetracyclines Hives       Review of Systems:  Positive for runny nose, back pain.  Balance of review of systems is negative.    Physical Exam:  Vitals:    02/14/24 0828   BP: (!) 162/70   Pulse: 61   Weight: 95.2 kg (209 lb 15.8 oz)   Height: 5' 9" (1.753 m)   PainSc:   5   PainLoc: Back     Body mass index is 31.01 kg/m².    Gen: NAD  Psych: mood appropriate for given condition  HEENT: eyes anicteric   CV: " RRR  HEENT: anicteric   Respiratory: non-labored, no signs of respiratory distress  Abd: non-distended  Skin: warm, dry and intact.  Gait: No antalgic gait.     Tenderness over the midthoracic spine on palpation  No evidence of rashes or vesicles    Sensory:  Intact and symmetrical to light touch in C4-T1 dermatomes bilaterally. Intact and symmetrical to light touch in L1-S1 dermatomes bilaterally.    Motor:    Right Left   C4 Shoulder Abduction  5  5   C5 Elbow Flexion    5  5   C6 Wrist Extension  5  5   C7 Elbow Extension   5  5   C8/T1 Hand Intrinsics   5  5        Right Left   L2/3 Iliacus Hip flexion  5  5   L3/4 Qudratus Femoris Knee Extension  5  5   L4/5 Tib Anterior Ankle Dorsiflexion   5  5   L5/S1 Extensor Hallicus Longus Great toe extension  5  5   S1/S2 Gastroc/Soleus Plantar Flexion  5  5      Right Left   Triceps DTR 0 0   Biceps DTR 0 0        Patellar DTR 1+ 1+   Achilles DTR 1+ 1+   Mancilla Absent  Absent                 Labs:  Lab Results   Component Value Date    HGBA1C 5.4 10/05/2023       Lab Results   Component Value Date    WBC 6.79 10/05/2023    HGB 13.0 10/05/2023    HCT 40.9 10/05/2023    MCV 94 10/05/2023     10/05/2023           Imaging:  Xray lumbar spine 2/8/24  FINDINGS:  Five views of the lumbar spine demonstrate 5 non-rib-bearing lumbar vertebral bodies.  There is no loss of vertebral body height.  There is no spondylolisthesis.  There is mild lower lumbar degenerative facet arthropathy.    Xray thoracic spine 2/8/24  FINDINGS:  Frontal and lateral views of the thoracic spine demonstrate normal alignment.  There is no spondylolisthesis.  There is no loss of vertebral body or disc space height.  The included portions of the lung are clear.      Assessment:   Problem List Items Addressed This Visit    None  Visit Diagnoses       Thoracic radiculopathy    -  Primary    Pain in thoracic spine        Back spasm                  Lynn Turner is a 63 y.o. female patient who has a  past medical history of Anticoagulant long-term use, Aortic insufficiency, Arthritis, Bicuspid aortic valve, Carotid stenosis, COPD (chronic obstructive pulmonary disease), Diabetes mellitus, Headache, Hyperlipidemia, Hypertension, Meniere's disease, unspecified ear, Palpitations, Psoriasis, Sleep apnea, SVT (supraventricular tachycardia), and Tobacco use. She presents with back pain.  Bothering her for the past 3 months.  She reports that she was bending over to carry a table down a flight of stairs when she felt a pop in her midthoracic back.  Since that time she has been having midthoracic back pain that radiates towards the right greater than left.  The pain is constant, aching, grabbing, tight that radiates between her shoulder blades and around towards her ribs.  Pain is worse with sitting, standing, walking, bending, coughing, lifting and relieved with lying down and heat.  She denies any numbness or weakness.  She denies any bilateral bladder dysfunction.    - on exam she has full strength in her upper and lower extremities.  She is intact sensation to light touch bilateral C4-T1, L2-S1.  Heri's negative.  1+ bilateral patellar and Achilles.  - she has thoracic x-ray from January and February 2024 and both x-rays did not show any evidence of vertebral compression fracture  - over the past 8 weeks she has completed formal physical therapy and also has been taking NSAIDs and muscle relaxants however continues to have pain that is limiting her mobility and interfering with the quality of her life  - I think she may be having a thoracic radicular pain from a thoracic herniated disc given her history and persistent pain for over the past 12 weeks  - she is scheduled for thoracic MRI however her appointment is not until a month from now.  We are going to try to help her get this MRI move forward.  She will let me know when she completes her MRI and I will review it and discuss with her further treatment  options      : Not applicable    Shaji Paredes M.D.  Interventional Pain Medicine / Anesthesiology    This note was completed with dictation software and grammatical errors may exist.

## 2024-02-15 ENCOUNTER — HOSPITAL ENCOUNTER (OUTPATIENT)
Dept: RADIOLOGY | Facility: HOSPITAL | Age: 64
Discharge: HOME | End: 2024-02-15
Attending: STUDENT IN AN ORGANIZED HEALTH CARE EDUCATION/TRAINING PROGRAM
Payer: COMMERCIAL

## 2024-02-15 ENCOUNTER — PATIENT MESSAGE (OUTPATIENT)
Dept: PAIN MEDICINE | Facility: CLINIC | Age: 64
End: 2024-02-15
Payer: COMMERCIAL

## 2024-02-15 DIAGNOSIS — M54.6 PAIN IN THORACIC SPINE: ICD-10-CM

## 2024-02-15 DIAGNOSIS — M62.830 BACK SPASM: ICD-10-CM

## 2024-02-15 DIAGNOSIS — M54.9 DORSALGIA, UNSPECIFIED: ICD-10-CM

## 2024-02-15 PROCEDURE — 72157 MRI CHEST SPINE W/O & W/DYE: CPT | Mod: 26,,, | Performed by: RADIOLOGY

## 2024-02-15 PROCEDURE — 72157 MRI CHEST SPINE W/O & W/DYE: CPT | Mod: TC,PO

## 2024-02-15 PROCEDURE — A9585 GADOBUTROL INJECTION: HCPCS | Mod: PO | Performed by: STUDENT IN AN ORGANIZED HEALTH CARE EDUCATION/TRAINING PROGRAM

## 2024-02-15 PROCEDURE — 72158 MRI LUMBAR SPINE W/O & W/DYE: CPT | Mod: 26,,, | Performed by: RADIOLOGY

## 2024-02-15 PROCEDURE — 25500020 PHARM REV CODE 255: Mod: PO | Performed by: STUDENT IN AN ORGANIZED HEALTH CARE EDUCATION/TRAINING PROGRAM

## 2024-02-15 PROCEDURE — 72158 MRI LUMBAR SPINE W/O & W/DYE: CPT | Mod: TC,PO

## 2024-02-15 RX ORDER — GADOBUTROL 604.72 MG/ML
9 INJECTION INTRAVENOUS
Status: COMPLETED | OUTPATIENT
Start: 2024-02-15 | End: 2024-02-15

## 2024-02-15 RX ADMIN — GADOBUTROL 9 ML: 604.72 INJECTION INTRAVENOUS at 11:02

## 2024-02-19 ENCOUNTER — PATIENT MESSAGE (OUTPATIENT)
Dept: PAIN MEDICINE | Facility: CLINIC | Age: 64
End: 2024-02-19
Payer: COMMERCIAL

## 2024-02-20 NOTE — TELEPHONE ENCOUNTER
I called the patient and reviewed her MRI results.  Can you please schedule her for in office procedure visit for trigger point injections

## 2024-02-22 ENCOUNTER — PROCEDURE VISIT (OUTPATIENT)
Dept: PAIN MEDICINE | Facility: CLINIC | Age: 64
End: 2024-02-22
Payer: COMMERCIAL

## 2024-02-22 VITALS
SYSTOLIC BLOOD PRESSURE: 132 MMHG | DIASTOLIC BLOOD PRESSURE: 70 MMHG | WEIGHT: 209.88 LBS | BODY MASS INDEX: 30.99 KG/M2 | HEART RATE: 68 BPM

## 2024-02-22 DIAGNOSIS — M79.18 MYOFASCIAL PAIN: Primary | ICD-10-CM

## 2024-02-22 PROCEDURE — 20552 NJX 1/MLT TRIGGER POINT 1/2: CPT | Mod: S$GLB,,, | Performed by: ANESTHESIOLOGY

## 2024-02-22 RX ORDER — METHYLPREDNISOLONE ACETATE 40 MG/ML
40 INJECTION, SUSPENSION INTRA-ARTICULAR; INTRALESIONAL; INTRAMUSCULAR; SOFT TISSUE
Status: COMPLETED | OUTPATIENT
Start: 2024-02-22 | End: 2024-02-22

## 2024-02-22 RX ORDER — METHYLPREDNISOLONE ACETATE 40 MG/ML
40 INJECTION, SUSPENSION INTRA-ARTICULAR; INTRALESIONAL; INTRAMUSCULAR; SOFT TISSUE
Status: DISCONTINUED | OUTPATIENT
Start: 2024-02-22 | End: 2024-02-22 | Stop reason: HOSPADM

## 2024-02-22 RX ADMIN — METHYLPREDNISOLONE ACETATE 40 MG: 40 INJECTION, SUSPENSION INTRA-ARTICULAR; INTRALESIONAL; INTRAMUSCULAR; SOFT TISSUE at 02:02

## 2024-02-22 RX ADMIN — METHYLPREDNISOLONE ACETATE 40 MG: 40 INJECTION, SUSPENSION INTRA-ARTICULAR; INTRALESIONAL; INTRAMUSCULAR; SOFT TISSUE at 01:02

## 2024-02-22 NOTE — PROCEDURES
Trigger Point Injection    Performed by: Shaji Paredes MD  Authorized by: Shaji aPredes MD    Thoracic Paraspinal:  Bilateral    Consent Done?:  Yes (Written)    Pre-Procedure:   Indications:  Pain relief    Local anesthesia used?: Yes    Anesthesia:  Local infiltration  Local anesthetic:  Bupivacaine 0.25% without epinephrine and lidocaine 1% without epinephrine  Anesthetic total (ml):  7    Medications: 40 mg methylPREDNISolone acetate 40 mg/mL

## 2024-02-23 ENCOUNTER — PATIENT MESSAGE (OUTPATIENT)
Dept: PAIN MEDICINE | Facility: CLINIC | Age: 64
End: 2024-02-23
Payer: COMMERCIAL

## 2024-02-23 NOTE — TELEPHONE ENCOUNTER
Spoke with patient she said this was only on the left side where she got most of her injections yesterday. She said is was white and solidified and stringy in texture.  Not all sites where like this just one or two.  Slightly red and little painful but not warm to the touch.  She just wanted to make sure that this was ok.

## 2024-02-23 NOTE — TELEPHONE ENCOUNTER
I spoke with the patient no signs or symptoms of infection.  Offered her follow-up however she will like to see how she progresses and would like to follow up on an as needed basis.  Call with any further questions or concerns

## 2024-03-03 ENCOUNTER — PATIENT MESSAGE (OUTPATIENT)
Dept: ADMINISTRATIVE | Facility: OTHER | Age: 64
End: 2024-03-03
Payer: COMMERCIAL

## 2024-04-22 ENCOUNTER — LAB VISIT (OUTPATIENT)
Dept: LAB | Facility: HOSPITAL | Age: 64
End: 2024-04-22
Attending: STUDENT IN AN ORGANIZED HEALTH CARE EDUCATION/TRAINING PROGRAM
Payer: COMMERCIAL

## 2024-04-22 ENCOUNTER — OFFICE VISIT (OUTPATIENT)
Dept: RHEUMATOLOGY | Facility: CLINIC | Age: 64
End: 2024-04-22
Payer: COMMERCIAL

## 2024-04-22 VITALS
BODY MASS INDEX: 30.76 KG/M2 | WEIGHT: 207.69 LBS | HEIGHT: 69 IN | DIASTOLIC BLOOD PRESSURE: 56 MMHG | SYSTOLIC BLOOD PRESSURE: 103 MMHG | HEART RATE: 62 BPM

## 2024-04-22 DIAGNOSIS — L40.9 PSORIASIS: ICD-10-CM

## 2024-04-22 DIAGNOSIS — D84.821 IMMUNODEFICIENCY DUE TO DRUGS: ICD-10-CM

## 2024-04-22 DIAGNOSIS — L40.50 PSORIATIC ARTHRITIS: Primary | ICD-10-CM

## 2024-04-22 DIAGNOSIS — Z79.899 IMMUNODEFICIENCY DUE TO DRUGS: ICD-10-CM

## 2024-04-22 LAB
ALBUMIN SERPL BCP-MCNC: 4.1 G/DL (ref 3.5–5.2)
ALP SERPL-CCNC: 74 U/L (ref 55–135)
ALT SERPL W/O P-5'-P-CCNC: 30 U/L (ref 10–44)
ANION GAP SERPL CALC-SCNC: 13 MMOL/L (ref 8–16)
AST SERPL-CCNC: 22 U/L (ref 10–40)
BASOPHILS # BLD AUTO: 0.11 K/UL (ref 0–0.2)
BASOPHILS NFR BLD: 1.3 % (ref 0–1.9)
BILIRUB SERPL-MCNC: 0.2 MG/DL (ref 0.1–1)
BUN SERPL-MCNC: 11 MG/DL (ref 8–23)
CALCIUM SERPL-MCNC: 9.9 MG/DL (ref 8.7–10.5)
CHLORIDE SERPL-SCNC: 102 MMOL/L (ref 95–110)
CO2 SERPL-SCNC: 24 MMOL/L (ref 23–29)
CREAT SERPL-MCNC: 0.8 MG/DL (ref 0.5–1.4)
CRP SERPL-MCNC: 3.2 MG/L (ref 0–8.2)
DIFFERENTIAL METHOD BLD: ABNORMAL
EOSINOPHIL # BLD AUTO: 0.3 K/UL (ref 0–0.5)
EOSINOPHIL NFR BLD: 3.8 % (ref 0–8)
ERYTHROCYTE [DISTWIDTH] IN BLOOD BY AUTOMATED COUNT: 13.8 % (ref 11.5–14.5)
ERYTHROCYTE [SEDIMENTATION RATE] IN BLOOD BY WESTERGREN METHOD: 9 MM/HR (ref 0–20)
EST. GFR  (NO RACE VARIABLE): >60 ML/MIN/1.73 M^2
GLUCOSE SERPL-MCNC: 145 MG/DL (ref 70–110)
HCT VFR BLD AUTO: 40.5 % (ref 37–48.5)
HGB BLD-MCNC: 13.2 G/DL (ref 12–16)
IMM GRANULOCYTES # BLD AUTO: 0.02 K/UL (ref 0–0.04)
IMM GRANULOCYTES NFR BLD AUTO: 0.2 % (ref 0–0.5)
LYMPHOCYTES # BLD AUTO: 2.7 K/UL (ref 1–4.8)
LYMPHOCYTES NFR BLD: 30.8 % (ref 18–48)
MCH RBC QN AUTO: 31.1 PG (ref 27–31)
MCHC RBC AUTO-ENTMCNC: 32.6 G/DL (ref 32–36)
MCV RBC AUTO: 96 FL (ref 82–98)
MONOCYTES # BLD AUTO: 0.7 K/UL (ref 0.3–1)
MONOCYTES NFR BLD: 7.6 % (ref 4–15)
NEUTROPHILS # BLD AUTO: 4.9 K/UL (ref 1.8–7.7)
NEUTROPHILS NFR BLD: 56.3 % (ref 38–73)
NRBC BLD-RTO: 0 /100 WBC
PLATELET # BLD AUTO: 354 K/UL (ref 150–450)
PMV BLD AUTO: 10.9 FL (ref 9.2–12.9)
POTASSIUM SERPL-SCNC: 3.6 MMOL/L (ref 3.5–5.1)
PROT SERPL-MCNC: 7.1 G/DL (ref 6–8.4)
RBC # BLD AUTO: 4.24 M/UL (ref 4–5.4)
SODIUM SERPL-SCNC: 139 MMOL/L (ref 136–145)
WBC # BLD AUTO: 8.72 K/UL (ref 3.9–12.7)

## 2024-04-22 PROCEDURE — 85651 RBC SED RATE NONAUTOMATED: CPT | Performed by: STUDENT IN AN ORGANIZED HEALTH CARE EDUCATION/TRAINING PROGRAM

## 2024-04-22 PROCEDURE — 99999 PR PBB SHADOW E&M-EST. PATIENT-LVL III: CPT | Mod: PBBFAC,,, | Performed by: STUDENT IN AN ORGANIZED HEALTH CARE EDUCATION/TRAINING PROGRAM

## 2024-04-22 PROCEDURE — 3008F BODY MASS INDEX DOCD: CPT | Mod: CPTII,S$GLB,, | Performed by: STUDENT IN AN ORGANIZED HEALTH CARE EDUCATION/TRAINING PROGRAM

## 2024-04-22 PROCEDURE — 80053 COMPREHEN METABOLIC PANEL: CPT | Performed by: STUDENT IN AN ORGANIZED HEALTH CARE EDUCATION/TRAINING PROGRAM

## 2024-04-22 PROCEDURE — 3078F DIAST BP <80 MM HG: CPT | Mod: CPTII,S$GLB,, | Performed by: STUDENT IN AN ORGANIZED HEALTH CARE EDUCATION/TRAINING PROGRAM

## 2024-04-22 PROCEDURE — 85025 COMPLETE CBC W/AUTO DIFF WBC: CPT | Performed by: STUDENT IN AN ORGANIZED HEALTH CARE EDUCATION/TRAINING PROGRAM

## 2024-04-22 PROCEDURE — 3044F HG A1C LEVEL LT 7.0%: CPT | Mod: CPTII,S$GLB,, | Performed by: STUDENT IN AN ORGANIZED HEALTH CARE EDUCATION/TRAINING PROGRAM

## 2024-04-22 PROCEDURE — 99215 OFFICE O/P EST HI 40 MIN: CPT | Mod: S$GLB,,, | Performed by: STUDENT IN AN ORGANIZED HEALTH CARE EDUCATION/TRAINING PROGRAM

## 2024-04-22 PROCEDURE — 1159F MED LIST DOCD IN RCRD: CPT | Mod: CPTII,S$GLB,, | Performed by: STUDENT IN AN ORGANIZED HEALTH CARE EDUCATION/TRAINING PROGRAM

## 2024-04-22 PROCEDURE — 3074F SYST BP LT 130 MM HG: CPT | Mod: CPTII,S$GLB,, | Performed by: STUDENT IN AN ORGANIZED HEALTH CARE EDUCATION/TRAINING PROGRAM

## 2024-04-22 PROCEDURE — 86140 C-REACTIVE PROTEIN: CPT | Performed by: STUDENT IN AN ORGANIZED HEALTH CARE EDUCATION/TRAINING PROGRAM

## 2024-04-22 PROCEDURE — 1160F RVW MEDS BY RX/DR IN RCRD: CPT | Mod: CPTII,S$GLB,, | Performed by: STUDENT IN AN ORGANIZED HEALTH CARE EDUCATION/TRAINING PROGRAM

## 2024-04-22 PROCEDURE — 4010F ACE/ARB THERAPY RXD/TAKEN: CPT | Mod: CPTII,S$GLB,, | Performed by: STUDENT IN AN ORGANIZED HEALTH CARE EDUCATION/TRAINING PROGRAM

## 2024-04-22 PROCEDURE — 36415 COLL VENOUS BLD VENIPUNCTURE: CPT | Performed by: STUDENT IN AN ORGANIZED HEALTH CARE EDUCATION/TRAINING PROGRAM

## 2024-04-22 NOTE — PROGRESS NOTES
RHEUMATOLOGY CLINIC ESTABLISHED PATIENT VISIT    Reason for consult:- psoriatic arthritis; plaque psoriasis    Chief complaints, HPI, ROS, EXAM, Assessment & Plans:-    Lynn Turner is a 63 y.o. pleasant female who presents to follow-up from her previous visit November for management of psoriatic arthritis and plaque psoriasis.  Overall this has been doing very well on Otezla.  Her hands are completely cleared up.  Inframammary rash cleared quickly with topical steroids.  Denies any dactylitis.  No actively tender or swollen joints.  No enthesopathy symptoms.  No visual changes.  No abdominal pain.  She does continue to have some upper back pain around her shoulder blades.  Does seem to be easing up somewhat.  Rheumatologic review of systems otherwise negative.  No dactylitis synovitis or enthesitis.  No active rashes noted.      Reviewed all available old and outside pertinent medical records.    All lab results personally reviewed and interpreted by me.    1. Psoriatic arthritis    2. 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        Problem List Items Addressed This Visit          Derm    Psoriasis       Orthopedic    Psoriatic arthritis - Primary     Other Visit Diagnoses       Immunodeficiency due to drugs                Patient following up today for psoriasis and psoriatic arthritis  She has had good improvement of her palmar plantar psoriasis as well as joint pain/stiffness since starting Otezla  Inverse psoriasis cleared with topical steroids  Drug therapy requiring intensive monitoring for toxicity  High Risk Medication Monitoring encounter  No current medication related issues, no evidence of toxicity  Appropriate labs ordered for toxicity monitoring  Compromised immune system secondary to autoimmune disease and/or use of immunosuppressive drugs.  Monitor carefully for infections.  Advised patient to get immediate medical care if any infection arises.  Also advised strict adherence age-appropriate  vaccinations and cancer screenings with PCP.  Patient advised to hold DMARD and/or biologic therapy for signs of infection or for surgery. If you are unsure what to do please call our office for instruction.[facility] [phone]      # Follow up in about 4 months (around 2024) for Virtual Visit.    Chronic comorbid conditions affecting medical decision making today:    Past Medical History:   Diagnosis Date    Anticoagulant long-term use     Aortic insufficiency     Arthritis     Bicuspid aortic valve     Carotid stenosis     COPD (chronic obstructive pulmonary disease)     Diabetes mellitus     Headache     Hyperlipidemia     Hypertension     diet controlled    Meniere's disease, unspecified ear     Palpitations     Psoriasis     Sleep apnea     uses cpap    SVT (supraventricular tachycardia)     Tobacco use        Past Surgical History:   Procedure Laterality Date    ANGIOGRAM, CORONARY, WITH LEFT HEART CATHETERIZATION N/A 2023    Procedure: Left Heart Cath;  Surgeon: Epi Bobo MD;  Location: [facility] CATH;  Service: Cardiology;  Laterality: N/A;    BREAST BIOPSY Right     excisional bx benign    BREAST SURGERY Right     hematoma from trauma    CARDIAC ELECTROPHYSIOLOGY STUDY AND ABLATION      COLONOSCOPY N/A 2016    Procedure: COLONOSCOPY;  Surgeon: Sushant Gutierrez MD;  Location[facility] ENDO;  Service: Endoscopy;  Laterality: N/A;    COLONOSCOPY N/A 2022    Procedure: COLONOSCOPY;  Surgeon: Bakari Mai Jr., MD;  Location: [facility] ENDO;  Service: Endoscopy;  Laterality: N/A;    HYSTERECTOMY      partial    KNEE ARTHROSCOPY Right         Social History     Tobacco Use    Smoking status: Former     Current packs/day: 0.00     Average packs/day: 0.8 packs/day for 46.8 years (35.1 ttl pk-yrs)     Types: Cigarettes     Start date: 1976     Quit date: 10/9/2022     Years since quittin.5    Smokeless tobacco: Never    Tobacco comments:     Pt has stopped &  restarted multiple times   Substance Use Topics    Alcohol use: Yes     Comment: occasional    Drug use: No       Family History   Problem Relation Name Age of Onset    Cataracts Mother      Stroke Mother      Diabetes Mother      Hypertension Mother      Hyperlipidemia Mother      Macular degeneration Father      Cataracts Father      Heart disease Father      Coronary artery disease Father      Diabetes Father      Hypertension Father      Macular degeneration Paternal Uncle         Review of patient's allergies indicates:   Allergen Reactions    Lisinopril Other (See Comments)     uncontrollable cough    Penicillins Hives and Swelling    Erythromycin Hives    Ibuprofen Itching and Rash     Aspirin OK    Tetracyclines Hives       Medication List with Changes/Refills   Current Medications    APREMILAST (OTEZLA) 30 MG TAB    Take 1 tablet (30 mg total) by mouth 2 (two) times daily.    ASPIRIN 325 MG TABLET    Take 325 mg by mouth once daily.    CHLORTHALIDONE (HYGROTEN) 25 MG TAB    Take 1 tablet (25 mg total) by mouth once daily.    CLOBETASOL 0.05% (TEMOVATE) 0.05 % OINT    Apply topically 2 (two) times daily.    LIDOCAINE (LIDODERM) 5 %    Place 1 patch onto the skin once daily. Remove & Discard patch within 12 hours or as directed by MD    LORAZEPAM (ATIVAN) 0.5 MG TABLET    Take 1 tablet (0.5 mg total) by mouth every 8 (eight) hours as needed for Anxiety.    LOSARTAN (COZAAR) 100 MG TABLET    Take 1 tablet (100 mg total) by mouth once daily.    METFORMIN (GLUCOPHAGE) 500 MG TABLET    Take 2 tablets (1,000 mg total) by mouth 2 (two) times daily with meals.    ROSUVASTATIN (CRESTOR) 5 MG TABLET    Take 1 tablet (5 mg total) by mouth once daily.    TRAZODONE (DESYREL) 50 MG TABLET    Take 1 tablet (50 mg total) by mouth every evening. TAKE 1 TABLET BY MOUTH TWO HOURS BEFORE BEDTIME    VERAPAMIL (VERELAN) 180 MG C24P    Take 1 capsule (180 mg total) by mouth every morning.    VIT A/VIT C/VIT E/ZINC/COPPER (ICAPS  AREDS ORAL)    Take by mouth once daily.         Disclaimer: This note was prepared using voice recognition system and is likely to have sound alike errors and is not proofread.  Please message me with any questions.    32 minutes of total time spent on the encounter, which includes face to face time and non-face to face time preparing to see the patient (eg, review of tests), Obtaining and/or reviewing separately obtained history, Documenting clinical information in the electronic or other health record, Independently interpreting results (not separately reported) and communicating results to the patient/family/caregiver, or Care coordination (not separately reported).     Thank you for allowing me to participate in the care of Lynn Turner.    Jeyson Nikc MD

## 2024-05-02 DIAGNOSIS — L40.50 PSORIATIC ARTHRITIS: ICD-10-CM

## 2024-05-02 DIAGNOSIS — L40.9 PSORIASIS: ICD-10-CM

## 2024-05-02 RX ORDER — APREMILAST 30 MG/1
30 TABLET, FILM COATED ORAL 2 TIMES DAILY
Qty: 60 TABLET | Refills: 3 | Status: ACTIVE | OUTPATIENT
Start: 2024-05-02

## 2024-08-19 ENCOUNTER — OFFICE VISIT (OUTPATIENT)
Dept: PAIN MEDICINE | Facility: CLINIC | Age: 64
End: 2024-08-19
Payer: COMMERCIAL

## 2024-08-19 ENCOUNTER — HOSPITAL ENCOUNTER (OUTPATIENT)
Dept: RADIOLOGY | Facility: HOSPITAL | Age: 64
Discharge: HOME | End: 2024-08-19
Attending: ANESTHESIOLOGY
Payer: COMMERCIAL

## 2024-08-19 VITALS
DIASTOLIC BLOOD PRESSURE: 61 MMHG | HEIGHT: 69 IN | BODY MASS INDEX: 31 KG/M2 | WEIGHT: 209.31 LBS | SYSTOLIC BLOOD PRESSURE: 133 MMHG | HEART RATE: 62 BPM

## 2024-08-19 DIAGNOSIS — M54.6 PAIN IN THORACIC SPINE: Primary | ICD-10-CM

## 2024-08-19 DIAGNOSIS — M79.18 MYOFASCIAL PAIN: ICD-10-CM

## 2024-08-19 DIAGNOSIS — M54.6 PAIN IN THORACIC SPINE: ICD-10-CM

## 2024-08-19 PROCEDURE — 3078F DIAST BP <80 MM HG: CPT | Mod: CPTII,S$GLB,, | Performed by: ANESTHESIOLOGY

## 2024-08-19 PROCEDURE — 3044F HG A1C LEVEL LT 7.0%: CPT | Mod: CPTII,S$GLB,, | Performed by: ANESTHESIOLOGY

## 2024-08-19 PROCEDURE — 1159F MED LIST DOCD IN RCRD: CPT | Mod: CPTII,S$GLB,, | Performed by: ANESTHESIOLOGY

## 2024-08-19 PROCEDURE — 99999 PR PBB SHADOW E&M-EST. PATIENT-LVL IV: CPT | Mod: PBBFAC,,, | Performed by: ANESTHESIOLOGY

## 2024-08-19 PROCEDURE — 72070 X-RAY EXAM THORAC SPINE 2VWS: CPT | Mod: TC,PO

## 2024-08-19 PROCEDURE — 4010F ACE/ARB THERAPY RXD/TAKEN: CPT | Mod: CPTII,S$GLB,, | Performed by: ANESTHESIOLOGY

## 2024-08-19 PROCEDURE — 99214 OFFICE O/P EST MOD 30 MIN: CPT | Mod: S$GLB,,, | Performed by: ANESTHESIOLOGY

## 2024-08-19 PROCEDURE — 3075F SYST BP GE 130 - 139MM HG: CPT | Mod: CPTII,S$GLB,, | Performed by: ANESTHESIOLOGY

## 2024-08-19 PROCEDURE — 72070 X-RAY EXAM THORAC SPINE 2VWS: CPT | Mod: 26,,, | Performed by: RADIOLOGY

## 2024-08-19 PROCEDURE — 3008F BODY MASS INDEX DOCD: CPT | Mod: CPTII,S$GLB,, | Performed by: ANESTHESIOLOGY

## 2024-08-19 RX ORDER — BACLOFEN 10 MG/1
10 TABLET ORAL 2 TIMES DAILY PRN
Qty: 60 TABLET | Refills: 1 | Status: SHIPPED | OUTPATIENT
Start: 2024-08-19 | End: 2024-10-18

## 2024-08-19 NOTE — PROGRESS NOTES
[facility] Follow Up Evaluation      Referred by: No ref. provider found    PCP:     CC:   Chief Complaint   Patient presents with    Mid-back Pain          8/19/2024     2:31 PM 2/14/2024     8:27 AM   Last 3 PDI Scores   Pain Disability Index (PDI) 33 20       Interval HPI 8/19/24: Ms. Turner returns to the office for follow up.  She reports that her left-sided midthoracic back pain has improved however she continues to have some right-sided midthoracic back pain.  No radicular symptoms.  Pain worsened month or 2 ago when she awkwardly bent towards the right while on a riding mower.    HPI:   Lynn Turner is a 63 y.o. female patient who has a past medical history of Anticoagulant long-term use, Aortic insufficiency, Arthritis, Bicuspid aortic valve, Carotid stenosis, COPD (chronic obstructive pulmonary disease), Diabetes mellitus, Headache, Hyperlipidemia, Hypertension, Meniere's disease, unspecified ear, Palpitations, Psoriasis, Sleep apnea, SVT (supraventricular tachycardia), and Tobacco use. She presents with back pain.  Bothering her for the past 3 months.  She reports that she was bending over to carry a table down a flight of stairs when she felt a pop in her midthoracic back.  Since that time she has been having midthoracic back pain that radiates towards the right greater than left.  The pain is constant, aching, grabbing, tight that radiates between her shoulder blades and around towards her ribs.  Pain is worse with sitting, standing, walking, bending, coughing, lifting and relieved with lying down and heat.  She denies any numbness or weakness.  She denies any bilateral bladder dysfunction.      Pain Intervention History:      Past Spine Surgical History:      Past and current medications:  Antineuropathics:  NSAIDs: celebrex   Physical therapy: yes, completed   Antidepressants:  Muscle relaxers: tizanidine   Opioids:  Antiplatelets/Anticoagulants: aspirin     History:    Current Outpatient  Medications:     apremilast (OTEZLA) 30 mg Tab, Take 1 tablet (30 mg total) by mouth 2 (two) times daily., Disp: 60 tablet, Rfl: 3    aspirin 325 MG tablet, Take 325 mg by mouth once daily., Disp: , Rfl:     celecoxib (CELEBREX) 100 MG capsule, Take 1 capsule (100 mg total) by mouth 2 (two) times daily., Disp: 60 capsule, Rfl: 3    chlorthalidone (HYGROTEN) 25 MG Tab, Take 1 tablet (25 mg total) by mouth once daily., Disp: 90 tablet, Rfl: 3    clobetasol 0.05% (TEMOVATE) 0.05 % Oint, Apply topically 2 (two) times daily., Disp: 60 g, Rfl: 0    LIDOcaine (LIDODERM) 5 %, Place 1 patch onto the skin once daily. Remove & Discard patch within 12 hours or as directed by MD, Disp: 14 patch, Rfl: 3    losartan (COZAAR) 100 MG tablet, Take 1 tablet (100 mg total) by mouth once daily., Disp: 90 tablet, Rfl: 3    metFORMIN (GLUCOPHAGE) 500 MG tablet, Take 2 tablets (1,000 mg total) by mouth 2 (two) times daily with meals., Disp: 360 tablet, Rfl: 3    mupirocin (BACTROBAN) 2 % ointment, Apply topically 3 (three) times daily. (Patient taking differently: Apply topically 3 (three) times daily as needed.), Disp: 30 g, Rfl: 0    rosuvastatin (CRESTOR) 5 MG tablet, Take 1 tablet (5 mg total) by mouth once daily., Disp: 90 tablet, Rfl: 3    traZODone (DESYREL) 50 MG tablet, Take 1 tablet (50 mg total) by mouth every evening. TAKE 1 TABLET BY MOUTH TWO HOURS BEFORE BEDTIME, Disp: 90 tablet, Rfl: 3    verapamiL (VERELAN) 180 MG C24P, TAKE 1 CAPSULE(180 MG) BY MOUTH EVERY MORNING, Disp: 90 capsule, Rfl: 3    vit A/vit C/vit E/zinc/copper (ICAPS AREDS ORAL), Take by mouth once daily., Disp: , Rfl:     baclofen (LIORESAL) 10 MG tablet, Take 1 tablet (10 mg total) by mouth 2 (two) times daily as needed (muscle spasms)., Disp: 60 tablet, Rfl: 1    LORazepam (ATIVAN) 0.5 MG tablet, Take 1 tablet (0.5 mg total) by mouth every 8 (eight) hours as needed for Anxiety., Disp: 14 tablet, Rfl: 0    Past Medical History:   Diagnosis Date     Anticoagulant long-term use     Aortic insufficiency     Arthritis     Bicuspid aortic valve     Carotid stenosis     COPD (chronic obstructive pulmonary disease)     Diabetes mellitus     Headache     Hyperlipidemia     Hypertension     diet controlled    Meniere's disease, unspecified ear     Palpitations     Psoriasis     Sleep apnea     uses cpap    SVT (supraventricular tachycardia)     Tobacco use        Past Surgical History:   Procedure Laterality Date    ANGIOGRAM, CORONARY, WITH LEFT HEART CATHETERIZATION N/A 2023    Procedure: Left Heart Cath;  Surgeon: Epi Bobo MD;  Location: [facility] CATH;  Service: Cardiology;  Laterality: N/A;    BREAST BIOPSY Right     excisional bx benign    BREAST SURGERY Right     hematoma from trauma    CARDIAC ELECTROPHYSIOLOGY STUDY AND ABLATION      COLONOSCOPY N/A 2016    Procedure: COLONOSCOPY;  Surgeon: Sushant Gutierrez MD;  Location: [facility] ENDO;  Service: Endoscopy;  Laterality: N/A;    COLONOSCOPY N/A 2022    Procedure: COLONOSCOPY;  Surgeon: Bakari Mai Jr., MD;  Location: [facility] ENDO;  Service: Endoscopy;  Laterality: N/A;    HYSTERECTOMY      partial    KNEE ARTHROSCOPY Right        Family History   Problem Relation Name Age of Onset    Cataracts Mother      Stroke Mother      Diabetes Mother      Hypertension Mother      Hyperlipidemia Mother      Macular degeneration Father      Cataracts Father      Heart disease Father      Coronary artery disease Father      Diabetes Father      Hypertension Father      Macular degeneration Paternal Uncle         Social History     Socioeconomic History    Marital status:     Number of children: 4   Occupational History    Occupation: retired   Tobacco Use    Smoking status: Former     Current packs/day: 0.00     Average packs/day: 0.8 packs/day for 46.8 years (35.1 ttl pk-yrs)     Types: Cigarettes     Start date: 1976     Quit date: 10/9/2022     Years since quittin.8    Smokeless  tobacco: Never    Tobacco comments:     Pt has stopped & restarted multiple times   Substance and Sexual Activity    Alcohol use: Yes     Comment: occasional    Drug use: No    Sexual activity: Yes     Partners: Male     Social Determinants of Health     Financial Resource Strain: Low Risk  (12/12/2023)    Overall Financial Resource Strain (CARDIA)     Difficulty of Paying Living Expenses: Not very hard   Food Insecurity: No Food Insecurity (12/12/2023)    Hunger Vital Sign     Worried About Running Out of Food in the Last Year: Never true     Ran Out of Food in the Last Year: Never true   Transportation Needs: No Transportation Needs (12/12/2023)    PRAPARE - Transportation     Lack of Transportation (Medical): No     Lack of Transportation (Non-Medical): No   Physical Activity: Sufficiently Active (12/12/2023)    Exercise Vital Sign     Days of Exercise per Week: 5 days     Minutes of Exercise per Session: 30 min   Recent Concern: Physical Activity - Insufficiently Active (9/27/2023)    Exercise Vital Sign     Days of Exercise per Week: 3 days     Minutes of Exercise per Session: 30 min   Stress: No Stress Concern Present (12/12/2023)    [employer] - Occupational Stress Questionnaire     Feeling of Stress : Not at all   Housing Stability: Low Risk  (12/12/2023)    Housing Stability Vital Sign     Unable to Pay for Housing in the Last Year: No     Number of Places Lived in the Last Year: 1     Unstable Housing in the Last Year: No       Review of patient's allergies indicates:   Allergen Reactions    Lisinopril Other (See Comments)     uncontrollable cough    Penicillins Hives and Swelling    Erythromycin Hives    Ibuprofen Itching and Rash     Aspirin OK    Tetracyclines Hives       Review of Systems:  Positive for runny nose, back pain.  Balance of review of systems is negative.    Physical Exam:  Vitals:    08/19/24 1432   BP: 133/61   Pulse: 62   Weight: 95 kg (209 lb 5.2 oz)  "  Height: 5' 9" (1.753 m)   PainSc:   2   PainLoc: Back       Body mass index is 30.91 kg/m².    Gen: NAD  Psych: mood appropriate for given condition  HEENT: eyes anicteric   CV: RRR  HEENT: anicteric   Respiratory: non-labored, no signs of respiratory distress  Abd: non-distended  Skin: warm, dry and intact.  Gait: No antalgic gait.     Tenderness over the midthoracic spine on palpation  No evidence of rashes or vesicles    Sensory:  Intact and symmetrical to light touch in C4-T1 dermatomes bilaterally. Intact and symmetrical to light touch in L1-S1 dermatomes bilaterally.    Motor:    Right Left   C4 Shoulder Abduction  5  5   C5 Elbow Flexion    5  5   C6 Wrist Extension  5  5   C7 Elbow Extension   5  5   C8/T1 Hand Intrinsics   5  5        Right Left   L2/3 Iliacus Hip flexion  5  5   L3/4 Qudratus Femoris Knee Extension  5  5   L4/5 Tib Anterior Ankle Dorsiflexion   5  5   L5/S1 Extensor Hallicus Longus Great toe extension  5  5   S1/S2 Gastroc/Soleus Plantar Flexion  5  5      Right Left   Triceps DTR 0 0   Biceps DTR 0 0        Patellar DTR 1+ 1+   Achilles DTR 1+ 1+   Mancilla Absent  Absent                 Labs:  Lab Results   Component Value Date    HGBA1C 5.6 02/14/2024       Lab Results   Component Value Date    WBC 8.72 04/22/2024    HGB 13.2 04/22/2024    HCT 40.5 04/22/2024    MCV 96 04/22/2024     04/22/2024           Imaging:  Xray lumbar spine 2/8/24  FINDINGS:  Five views of the lumbar spine demonstrate 5 non-rib-bearing lumbar vertebral bodies.  There is no loss of vertebral body height.  There is no spondylolisthesis.  There is mild lower lumbar degenerative facet arthropathy.    Xray thoracic spine 2/8/24  FINDINGS:  Frontal and lateral views of the thoracic spine demonstrate normal alignment.  There is no spondylolisthesis.  There is no loss of vertebral body or disc space height.  The included portions of the lung are clear.    MRI thoracic and lumbar spine 2/15/24  Impression:   "   THORACIC:  1. Mild degenerative changes of the thoracic spine most pronounced ventrally at T7-T8 where there is degenerative disc height loss and associated Schmorl's nodes producing reactive endplate centered marrow edema.  No acute fractures.  2. No lateralizing disc herniation or cord impingement.  The spinal canal and foramina are patent throughout.  LUMBAR:  1. Relatively mild degenerative changes of the lumbar spine producing no more than mild spinal canal or foraminal narrowing at any particular level discussed in detail above.  No selective nerve impingement.  No acute process.      Assessment:   Problem List Items Addressed This Visit    None  Visit Diagnoses       Pain in thoracic spine    -  Primary    Relevant Orders    X-Ray Thoracic Spine AP Lateral    Myofascial pain        Relevant Medications    baclofen (LIORESAL) 10 MG tablet                Lynn Turner is a 63 y.o. female patient who has a past medical history of Anticoagulant long-term use, Aortic insufficiency, Arthritis, Bicuspid aortic valve, Carotid stenosis, COPD (chronic obstructive pulmonary disease), Diabetes mellitus, Headache, Hyperlipidemia, Hypertension, Meniere's disease, unspecified ear, Palpitations, Psoriasis, Sleep apnea, SVT (supraventricular tachycardia), and Tobacco use. She presents with back pain.  Bothering her for the past 3 months.  She reports that she was bending over to carry a table down a flight of stairs when she felt a pop in her midthoracic back.  Since that time she has been having midthoracic back pain that radiates towards the right greater than left.  The pain is constant, aching, grabbing, tight that radiates between her shoulder blades and around towards her ribs.  Pain is worse with sitting, standing, walking, bending, coughing, lifting and relieved with lying down and heat.  She denies any numbness or weakness.  She denies any bilateral bladder dysfunction.    8/19/24 - Ms. Turner returns to the office  for follow up.  She reports that her left-sided midthoracic back pain has improved however she continues to have some right-sided midthoracic back pain.  No radicular symptoms.  Pain worsened month or 2 ago when she awkwardly bent towards the right while on a riding mower.    - no tenderness palpation over the midline thoracic spine  - I am going to order an updated thoracic x-ray is to be extra cautious and evaluate her bony anatomy  - I independently reviewed her thoracic MRI she does not have any significant central or foraminal narrowing at any level.  - I still feel her pain is primarily myofascial.  She gets good relief with lidocaine patches in his using them 12 hours on 12 hours off.  She has been using tizanidine however it makes her very drowsy.  She will stop tizanidine and we will trial baclofen 1 to 2 times a day for the myofascial component of her pain.  She understands side effects may still include drowsiness.  I discussed she should look to optimize lifestyle modifications.  She gets some relief by wearing a sports bra as opposed to her regular broad.  I have recommend she wear a bra that is most comfortable for her.  We will have her come back in a couple months to repeat trigger point injections to the right side prior to her upcoming vacation.      : Not applicable    Shaji Paredes M.D.  Interventional Pain Medicine / Anesthesiology    This note was completed with dictation software and grammatical errors may exist.

## 2024-08-22 ENCOUNTER — LAB VISIT (OUTPATIENT)
Dept: LAB | Facility: HOSPITAL | Age: 64
End: 2024-08-22
Attending: INTERNAL MEDICINE
Payer: COMMERCIAL

## 2024-08-22 ENCOUNTER — OFFICE VISIT (OUTPATIENT)
Dept: RHEUMATOLOGY | Facility: CLINIC | Age: 64
End: 2024-08-22
Payer: COMMERCIAL

## 2024-08-22 VITALS
HEIGHT: 69 IN | BODY MASS INDEX: 30.85 KG/M2 | DIASTOLIC BLOOD PRESSURE: 76 MMHG | SYSTOLIC BLOOD PRESSURE: 144 MMHG | WEIGHT: 208.31 LBS | HEART RATE: 68 BPM

## 2024-08-22 DIAGNOSIS — L40.50 PSORIATIC ARTHRITIS: ICD-10-CM

## 2024-08-22 DIAGNOSIS — Z51.81 ENCOUNTER FOR MEDICATION MONITORING: ICD-10-CM

## 2024-08-22 DIAGNOSIS — M54.6 CHRONIC MIDLINE THORACIC BACK PAIN: ICD-10-CM

## 2024-08-22 DIAGNOSIS — M54.50 RIGHT-SIDED LOW BACK PAIN WITHOUT SCIATICA, UNSPECIFIED CHRONICITY: ICD-10-CM

## 2024-08-22 DIAGNOSIS — L40.9 PSORIASIS: Primary | ICD-10-CM

## 2024-08-22 DIAGNOSIS — G89.29 CHRONIC MIDLINE THORACIC BACK PAIN: ICD-10-CM

## 2024-08-22 DIAGNOSIS — L40.9 PSORIASIS: ICD-10-CM

## 2024-08-22 LAB
ALBUMIN SERPL BCP-MCNC: 4.2 G/DL (ref 3.5–5.2)
ALP SERPL-CCNC: 75 U/L (ref 55–135)
ALT SERPL W/O P-5'-P-CCNC: 29 U/L (ref 10–44)
ANION GAP SERPL CALC-SCNC: 10 MMOL/L (ref 8–16)
AST SERPL-CCNC: 16 U/L (ref 10–40)
BASOPHILS # BLD AUTO: 0.07 K/UL (ref 0–0.2)
BASOPHILS NFR BLD: 0.8 % (ref 0–1.9)
BILIRUB SERPL-MCNC: 0.3 MG/DL (ref 0.1–1)
BUN SERPL-MCNC: 16 MG/DL (ref 8–23)
CALCIUM SERPL-MCNC: 10 MG/DL (ref 8.7–10.5)
CHLORIDE SERPL-SCNC: 103 MMOL/L (ref 95–110)
CO2 SERPL-SCNC: 28 MMOL/L (ref 23–29)
CREAT SERPL-MCNC: 0.7 MG/DL (ref 0.5–1.4)
CRP SERPL-MCNC: 3.9 MG/L (ref 0–8.2)
DIFFERENTIAL METHOD BLD: NORMAL
EOSINOPHIL # BLD AUTO: 0.4 K/UL (ref 0–0.5)
EOSINOPHIL NFR BLD: 4.3 % (ref 0–8)
ERYTHROCYTE [DISTWIDTH] IN BLOOD BY AUTOMATED COUNT: 14.2 % (ref 11.5–14.5)
ERYTHROCYTE [SEDIMENTATION RATE] IN BLOOD BY PHOTOMETRIC METHOD: 9 MM/HR (ref 0–36)
EST. GFR  (NO RACE VARIABLE): >60 ML/MIN/1.73 M^2
GLUCOSE SERPL-MCNC: 99 MG/DL (ref 70–110)
HBV CORE AB SERPL QL IA: NORMAL
HBV SURFACE AB SER-ACNC: <3 MIU/ML
HBV SURFACE AB SER-ACNC: NORMAL M[IU]/ML
HBV SURFACE AG SERPL QL IA: NORMAL
HCT VFR BLD AUTO: 40.4 % (ref 37–48.5)
HCV AB SERPL QL IA: NORMAL
HGB BLD-MCNC: 13.4 G/DL (ref 12–16)
IMM GRANULOCYTES # BLD AUTO: 0.03 K/UL (ref 0–0.04)
IMM GRANULOCYTES NFR BLD AUTO: 0.4 % (ref 0–0.5)
LYMPHOCYTES # BLD AUTO: 2.3 K/UL (ref 1–4.8)
LYMPHOCYTES NFR BLD: 27.7 % (ref 18–48)
MCH RBC QN AUTO: 30 PG (ref 27–31)
MCHC RBC AUTO-ENTMCNC: 33.2 G/DL (ref 32–36)
MCV RBC AUTO: 91 FL (ref 82–98)
MONOCYTES # BLD AUTO: 0.5 K/UL (ref 0.3–1)
MONOCYTES NFR BLD: 6.5 % (ref 4–15)
NEUTROPHILS # BLD AUTO: 5 K/UL (ref 1.8–7.7)
NEUTROPHILS NFR BLD: 60.3 % (ref 38–73)
NRBC BLD-RTO: 0 /100 WBC
PLATELET # BLD AUTO: 350 K/UL (ref 150–450)
PMV BLD AUTO: 10.2 FL (ref 9.2–12.9)
POTASSIUM SERPL-SCNC: 3.6 MMOL/L (ref 3.5–5.1)
PROT SERPL-MCNC: 7.5 G/DL (ref 6–8.4)
RBC # BLD AUTO: 4.46 M/UL (ref 4–5.4)
SODIUM SERPL-SCNC: 141 MMOL/L (ref 136–145)
WBC # BLD AUTO: 8.34 K/UL (ref 3.9–12.7)

## 2024-08-22 PROCEDURE — 1159F MED LIST DOCD IN RCRD: CPT | Mod: CPTII,S$GLB,, | Performed by: INTERNAL MEDICINE

## 2024-08-22 PROCEDURE — 86706 HEP B SURFACE ANTIBODY: CPT | Mod: 91 | Performed by: INTERNAL MEDICINE

## 2024-08-22 PROCEDURE — 86704 HEP B CORE ANTIBODY TOTAL: CPT | Performed by: INTERNAL MEDICINE

## 2024-08-22 PROCEDURE — 87340 HEPATITIS B SURFACE AG IA: CPT | Performed by: INTERNAL MEDICINE

## 2024-08-22 PROCEDURE — 3077F SYST BP >= 140 MM HG: CPT | Mod: CPTII,S$GLB,, | Performed by: INTERNAL MEDICINE

## 2024-08-22 PROCEDURE — 86803 HEPATITIS C AB TEST: CPT | Performed by: INTERNAL MEDICINE

## 2024-08-22 PROCEDURE — 3078F DIAST BP <80 MM HG: CPT | Mod: CPTII,S$GLB,, | Performed by: INTERNAL MEDICINE

## 2024-08-22 PROCEDURE — 86140 C-REACTIVE PROTEIN: CPT | Performed by: INTERNAL MEDICINE

## 2024-08-22 PROCEDURE — 86480 TB TEST CELL IMMUN MEASURE: CPT | Performed by: INTERNAL MEDICINE

## 2024-08-22 PROCEDURE — 3044F HG A1C LEVEL LT 7.0%: CPT | Mod: CPTII,S$GLB,, | Performed by: INTERNAL MEDICINE

## 2024-08-22 PROCEDURE — 99999 PR PBB SHADOW E&M-EST. PATIENT-LVL IV: CPT | Mod: PBBFAC,,, | Performed by: INTERNAL MEDICINE

## 2024-08-22 PROCEDURE — 99215 OFFICE O/P EST HI 40 MIN: CPT | Mod: S$GLB,,, | Performed by: INTERNAL MEDICINE

## 2024-08-22 PROCEDURE — 36415 COLL VENOUS BLD VENIPUNCTURE: CPT | Performed by: INTERNAL MEDICINE

## 2024-08-22 PROCEDURE — G2211 COMPLEX E/M VISIT ADD ON: HCPCS | Mod: S$GLB,,, | Performed by: INTERNAL MEDICINE

## 2024-08-22 PROCEDURE — 85025 COMPLETE CBC W/AUTO DIFF WBC: CPT | Performed by: INTERNAL MEDICINE

## 2024-08-22 PROCEDURE — 4010F ACE/ARB THERAPY RXD/TAKEN: CPT | Mod: CPTII,S$GLB,, | Performed by: INTERNAL MEDICINE

## 2024-08-22 PROCEDURE — 80053 COMPREHEN METABOLIC PANEL: CPT | Performed by: INTERNAL MEDICINE

## 2024-08-22 PROCEDURE — 85652 RBC SED RATE AUTOMATED: CPT | Performed by: INTERNAL MEDICINE

## 2024-08-22 PROCEDURE — 3008F BODY MASS INDEX DOCD: CPT | Mod: CPTII,S$GLB,, | Performed by: INTERNAL MEDICINE

## 2024-08-22 RX ORDER — CELECOXIB 200 MG/1
200 CAPSULE ORAL 2 TIMES DAILY
Qty: 60 CAPSULE | Refills: 3 | Status: SHIPPED | OUTPATIENT
Start: 2024-08-22

## 2024-08-22 RX ORDER — TACROLIMUS 1 MG/G
OINTMENT TOPICAL 2 TIMES DAILY
Qty: 30 G | Refills: 11 | Status: SHIPPED | OUTPATIENT
Start: 2024-08-22

## 2024-08-22 NOTE — PROGRESS NOTES
RHEUMATOLOGY CLINIC ESTABLISHED PATIENT VISIT    Chief complaints, HPI, ROS, EXAM, Assessment & Plans:-    Lynn Turner is a 63 y.o. pleasant female who presents to follow-up for management of psoriatic arthritis and plaque psoriasis.  She complains of active rash and worsening joint pain.  Was well controlled on Otezla recently.  Denies any dactylitis.  Rheumatological review of system negative otherwise.  Physical examination shows mild joint tenderness but no synovitis or dactylitis.  Mild active rash present.      Reviewed all available old and outside pertinent medical records.    All lab results personally reviewed and interpreted by me.    1. Psoriasis    2. Psoriatic arthritis    3. Encounter for medication monitoring        Problem List Items Addressed This Visit       Encounter for medication monitoring    Psoriasis - Primary    Psoriatic arthritis      Latest Reference Range & Units 08/22/24 12:40   WBC 3.90 - 12.70 K/uL 8.34   RBC 4.00 - 5.40 M/uL 4.46   Hemoglobin 12.0 - 16.0 g/dL 13.4   Hematocrit 37.0 - 48.5 % 40.4   MCV 82 - 98 fL 91   MCH 27.0 - 31.0 pg 30.0   MCHC 32.0 - 36.0 g/dL 33.2   RDW 11.5 - 14.5 % 14.2   Platelet Count 150 - 450 K/uL 350   MPV 9.2 - 12.9 fL 10.2   Gran % 38.0 - 73.0 % 60.3   Lymph % 18.0 - 48.0 % 27.7   Mono % 4.0 - 15.0 % 6.5   Eos % 0.0 - 8.0 % 4.3   Basophil % 0.0 - 1.9 % 0.8   Immature Granulocytes 0.0 - 0.5 % 0.4   Gran # (ANC) 1.8 - 7.7 K/uL 5.0   Lymph # 1.0 - 4.8 K/uL 2.3   Mono # 0.3 - 1.0 K/uL 0.5   Eos # 0.0 - 0.5 K/uL 0.4   Baso # 0.00 - 0.20 K/uL 0.07   Immature Grans (Abs) 0.00 - 0.04 K/uL 0.03   nRBC 0 /100 WBC 0   Differential Method  Automated   Sed Rate 0 - 36 mm/Hr 9   Sodium 136 - 145 mmol/L 141   Potassium 3.5 - 5.1 mmol/L 3.6   Chloride 95 - 110 mmol/L 103   CO2 23 - 29 mmol/L 28   Anion Gap 8 - 16 mmol/L 10   BUN 8 - 23 mg/dL 16   Creatinine 0.5 - 1.4 mg/dL 0.7   eGFR >60 mL/min/1.73 m^2 >60   Glucose 70 - 110 mg/dL 99   Calcium 8.7 - 10.5 mg/dL  10.0   ALP 55 - 135 U/L 75   PROTEIN TOTAL 6.0 - 8.4 g/dL 7.5   Albumin 3.5 - 5.2 g/dL 4.2   BILIRUBIN TOTAL 0.1 - 1.0 mg/dL 0.3   AST 10 - 40 U/L 16   ALT 10 - 44 U/L 29   CRP 0.0 - 8.2 mg/L 3.9   Hep B Core Total Ab Non-reactive  Non-reactive   Hep B S Ab mIU/mL  -  <3.00  Non-reactive   Hepatitis B Surface Ag Non-reactive  Non-reactive   Hepatitis C Ab Non-reactive  Non-reactive   Mitogen - Nil IU/mL 7.260   NIL IU/mL 0.88323   TB Gold Plus Negative  Negative   TB1 - Nil IU/mL 0.005   TB2 - Nil IU/mL 0.017       Active plaque psoriasis and psoriatic arthritis.  Mild severity.  Add Celebrex.  Add topical Protopic.  If no significant improvement, consider alternate treatment options instead of Otezla.  Advised precautions.  Drug therapy requiring intensive monitoring for toxicity  High Risk Medication Monitoring encounter  No current medication related issues, no evidence of toxicity  Appropriate labs ordered for toxicity monitoring      # Follow up in about 3 months (around 11/22/2024).    Chronic comorbid conditions affecting medical decision making today:    Past Medical History:   Diagnosis Date    Anticoagulant long-term use     Aortic insufficiency     Arthritis     Bicuspid aortic valve     Carotid stenosis     COPD (chronic obstructive pulmonary disease)     Diabetes mellitus     Headache     Hyperlipidemia     Hypertension     diet controlled    Meniere's disease, unspecified ear     Palpitations     Psoriasis     Sleep apnea     uses cpap    SVT (supraventricular tachycardia)     Tobacco use        Past Surgical History:   Procedure Laterality Date    ANGIOGRAM, CORONARY, WITH LEFT HEART CATHETERIZATION N/A 4/25/2023    Procedure: Left Heart Cath;  Surgeon: Epi Bobo MD;  Location: [facility] CATH;  Service: Cardiology;  Laterality: N/A;    BREAST BIOPSY Right     excisional bx benign    BREAST SURGERY Right     hematoma from trauma    CARDIAC ELECTROPHYSIOLOGY STUDY AND ABLATION  2013    COLONOSCOPY N/A  2016    Procedure: COLONOSCOPY;  Surgeon: Sushant Gutierrez MD;  Location: [facility] ENDO;  Service: Endoscopy;  Laterality: N/A;    COLONOSCOPY N/A 2022    Procedure: COLONOSCOPY;  Surgeon: Bakari Mai Jr., MD;  Location: [facility] ENDO;  Service: Endoscopy;  Laterality: N/A;    HYSTERECTOMY      partial    KNEE ARTHROSCOPY Right         Social History     Tobacco Use    Smoking status: Former     Current packs/day: 0.00     Average packs/day: 0.8 packs/day for 46.8 years (35.1 ttl pk-yrs)     Types: Cigarettes     Start date: 1976     Quit date: 10/9/2022     Years since quittin.8    Smokeless tobacco: Never    Tobacco comments:     Pt has stopped & restarted multiple times   Substance Use Topics    Alcohol use: Yes     Comment: occasional    Drug use: No       Family History   Problem Relation Name Age of Onset    Cataracts Mother      Stroke Mother      Diabetes Mother      Hypertension Mother      Hyperlipidemia Mother      Macular degeneration Father      Cataracts Father      Heart disease Father      Coronary artery disease Father      Diabetes Father      Hypertension Father      Macular degeneration Paternal Uncle         Review of patient's allergies indicates:   Allergen Reactions    Lisinopril Other (See Comments)     uncontrollable cough    Penicillins Hives and Swelling    Erythromycin Hives    Ibuprofen Itching and Rash     Aspirin OK    Tetracyclines Hives       Medication List with Changes/Refills   Current Medications    APREMILAST (OTEZLA) 30 MG TAB    Take 1 tablet (30 mg total) by mouth 2 (two) times daily.    ASPIRIN 325 MG TABLET    Take 325 mg by mouth once daily.    BACLOFEN (LIORESAL) 10 MG TABLET    Take 1 tablet (10 mg total) by mouth 2 (two) times daily as needed (muscle spasms).    CELECOXIB (CELEBREX) 100 MG CAPSULE    Take 1 capsule (100 mg total) by mouth 2 (two) times daily.    CHLORTHALIDONE (HYGROTEN) 25 MG TAB    Take 1 tablet (25 mg total) by mouth once daily.     CLOBETASOL 0.05% (TEMOVATE) 0.05 % OINT    Apply topically 2 (two) times daily.    LIDOCAINE (LIDODERM) 5 %    Place 1 patch onto the skin once daily. Remove & Discard patch within 12 hours or as directed by MD    LORAZEPAM (ATIVAN) 0.5 MG TABLET    Take 1 tablet (0.5 mg total) by mouth every 8 (eight) hours as needed for Anxiety.    LOSARTAN (COZAAR) 100 MG TABLET    Take 1 tablet (100 mg total) by mouth once daily.    METFORMIN (GLUCOPHAGE) 500 MG TABLET    Take 2 tablets (1,000 mg total) by mouth 2 (two) times daily with meals.    MUPIROCIN (BACTROBAN) 2 % OINTMENT    Apply topically 3 (three) times daily.    ROSUVASTATIN (CRESTOR) 5 MG TABLET    Take 1 tablet (5 mg total) by mouth once daily.    TRAZODONE (DESYREL) 50 MG TABLET    Take 1 tablet (50 mg total) by mouth every evening. TAKE 1 TABLET BY MOUTH TWO HOURS BEFORE BEDTIME    VERAPAMIL (VERELAN) 180 MG C24P    TAKE 1 CAPSULE(180 MG) BY MOUTH EVERY MORNING    VIT A/VIT C/VIT E/ZINC/COPPER (ICAPS AREDS ORAL)    Take by mouth once daily.         Disclaimer: This note was prepared using voice recognition system and is likely to have sound alike errors and is not proofread.  Please message me with any questions.    Total time spent 40 minutes. This includes face to face time and non-face to face time preparing to see the patient (eg, review of tests), obtaining and/or reviewing separately obtained history, documenting clinical information in the electronic or other health record, independently interpreting results and communicating results to the patient/family/caregiver, or care coordinator.

## 2024-08-24 LAB
GAMMA INTERFERON BACKGROUND BLD IA-ACNC: 0 IU/ML
M TB IFN-G CD4+ BCKGRND COR BLD-ACNC: 0.01 IU/ML
M TB IFN-G CD4+ BCKGRND COR BLD-ACNC: 0.02 IU/ML
MITOGEN IGNF BCKGRD COR BLD-ACNC: 7.26 IU/ML
TB GOLD PLUS: NEGATIVE

## 2024-09-03 DIAGNOSIS — L40.50 PSORIATIC ARTHRITIS: ICD-10-CM

## 2024-09-03 DIAGNOSIS — L40.9 PSORIASIS: ICD-10-CM

## 2024-09-03 RX ORDER — APREMILAST 30 MG/1
30 TABLET, FILM COATED ORAL 2 TIMES DAILY
Qty: 60 TABLET | Refills: 3 | Status: ACTIVE | OUTPATIENT
Start: 2024-09-03

## 2024-09-16 ENCOUNTER — PATIENT MESSAGE (OUTPATIENT)
Dept: RHEUMATOLOGY | Facility: CLINIC | Age: 64
End: 2024-09-16
Payer: COMMERCIAL

## 2024-09-16 DIAGNOSIS — L40.9 PSORIASIS: Primary | ICD-10-CM

## 2024-09-16 DIAGNOSIS — L40.50 PSORIATIC ARTHRITIS: ICD-10-CM

## 2024-09-16 RX ORDER — IXEKIZUMAB 80 MG/ML
INJECTION, SOLUTION SUBCUTANEOUS
Qty: 2 ML | Refills: 2 | Status: ACTIVE | OUTPATIENT
Start: 2024-09-16 | End: 2024-09-18

## 2024-09-16 RX ORDER — IXEKIZUMAB 80 MG/ML
80 INJECTION, SOLUTION SUBCUTANEOUS
Qty: 1 ML | Refills: 5 | Status: ACTIVE | OUTPATIENT
Start: 2024-09-16 | End: 2024-09-18

## 2024-09-16 RX ORDER — IXEKIZUMAB 80 MG/ML
INJECTION, SOLUTION SUBCUTANEOUS
Qty: 3 ML | Refills: 0 | Status: ACTIVE | OUTPATIENT
Start: 2024-09-16 | End: 2024-09-18

## 2024-09-16 NOTE — TELEPHONE ENCOUNTER
Per visit on 8/22:    If no significant improvement, consider alternate treatment options instead of Otezla.

## 2024-09-23 ENCOUNTER — TELEPHONE (OUTPATIENT)
Dept: RHEUMATOLOGY | Facility: CLINIC | Age: 64
End: 2024-09-23
Payer: COMMERCIAL

## 2024-09-23 DIAGNOSIS — L40.50 PSORIATIC ARTHRITIS: ICD-10-CM

## 2024-09-23 DIAGNOSIS — L40.9 PSORIASIS: ICD-10-CM

## 2024-09-23 RX ORDER — IXEKIZUMAB 80 MG/ML
INJECTION, SOLUTION SUBCUTANEOUS
Qty: 2 ML | Refills: 2 | Status: ACTIVE | OUTPATIENT
Start: 2024-09-23

## 2024-09-23 RX ORDER — IXEKIZUMAB 80 MG/ML
INJECTION, SOLUTION SUBCUTANEOUS
Qty: 3 ML | Refills: 0 | Status: ACTIVE | OUTPATIENT
Start: 2024-09-23

## 2024-09-23 RX ORDER — IXEKIZUMAB 80 MG/ML
80 INJECTION, SOLUTION SUBCUTANEOUS
Qty: 1 ML | Refills: 5 | Status: ACTIVE | OUTPATIENT
Start: 2024-09-23

## 2024-09-30 DIAGNOSIS — L40.9 PSORIASIS: ICD-10-CM

## 2024-09-30 DIAGNOSIS — L40.50 PSORIATIC ARTHRITIS: Primary | ICD-10-CM

## 2024-09-30 RX ORDER — GUSELKUMAB 100 MG/ML
100 INJECTION SUBCUTANEOUS
Qty: 1 ML | Refills: 3 | Status: ACTIVE | OUTPATIENT
Start: 2024-09-30

## 2024-09-30 RX ORDER — GUSELKUMAB 100 MG/ML
INJECTION SUBCUTANEOUS
Qty: 2 ML | Refills: 0 | Status: ACTIVE | OUTPATIENT
Start: 2024-09-30

## 2024-10-20 DIAGNOSIS — M79.18 MYOFASCIAL PAIN: ICD-10-CM

## 2024-10-21 RX ORDER — BACLOFEN 10 MG/1
10 TABLET ORAL 2 TIMES DAILY PRN
Qty: 60 TABLET | Refills: 1 | Status: SHIPPED | OUTPATIENT
Start: 2024-10-21 | End: 2024-12-20

## 2024-11-06 ENCOUNTER — PATIENT MESSAGE (OUTPATIENT)
Dept: RHEUMATOLOGY | Facility: CLINIC | Age: 64
End: 2024-11-06
Payer: COMMERCIAL

## 2024-11-06 ENCOUNTER — TELEPHONE (OUTPATIENT)
Dept: PHARMACY | Facility: CLINIC | Age: 64
End: 2024-11-06
Payer: COMMERCIAL

## 2024-11-06 NOTE — TELEPHONE ENCOUNTER
[facility]/Population Health Chart Review & Patient Outreach Details For Medication Adherence Project    Reason for Outreach Encounter: 3rd Party payor non-compliance report ([employer], [employer], C, etc)  2.  Patient Outreach Method: Reviewed patient chart   3.   Medication in question:   Diabetes Medications               metFORMIN (GLUCOPHAGE) 500 MG tablet Take 2 tablets (1,000 mg total) by mouth 2 (two) times daily with meals.                 METFORMIN  last filled  8/28 for 90 day supply      4.  Reviewed and or Updates Made To: Patient Chart  5. Outreach Outcomes and/or actions taken: Patient filled medication and is on track to be adherent  Additional Notes:

## 2024-11-20 PROBLEM — D84.821 DRUG-INDUCED IMMUNODEFICIENCY: Status: ACTIVE | Noted: 2024-11-20

## 2024-11-20 PROBLEM — Z79.899 DRUG-INDUCED IMMUNODEFICIENCY: Status: ACTIVE | Noted: 2024-11-20

## 2024-11-20 NOTE — PROGRESS NOTES
RHEUMATOLOGY FOLLOW UP - TELE VISIT     The patient location is: [place]  The chief complaint leading to consultation is:  Follow-up for psoriasis and psoriatic arthritis  Visit type: Virtual visit with synchronous audio and video  Total time spent with patient:  15 minutes  Each patient to whom he or she provides medical services by telemedicine is:  (1) informed of the relationship between the physician and patient and the respective role of any other health care provider with respect to management of the patient; and (2) notified that he or she may decline to receive medical services by telemedicine and may withdraw from such care at any time.      Chief complaints, HPI, ROS, EXAM, Assessment & Plans:-    Lynn Turner is a 64 y.o. pleasant female who is seen today for follow-up visit.  She follows in the Rheumatology Clinic for psoriatic arthritis with severe plaque psoriasis and palmoplantar psoriasis.  Reports more than 80% improvement of both rash and joint pain along with stiffness and swelling since starting Tremfya.  No significant adverse reaction other than mild injection site reaction.   Rheumatological review of system negative otherwise.  Physical examination shows complete resolution of rash over bilateral palms.  No dactylitis.  100% fist formation bilateral hands.      1. Psoriasis    2. Psoriatic arthritis    3. Encounter for medication monitoring    4. Drug-induced immunodeficiency        Problem List Items Addressed This Visit       Drug-induced immunodeficiency    Encounter for medication monitoring    Psoriasis - Primary    Psoriatic arthritis      Latest Reference Range & Units 11/15/24 09:35   WBC 3.90 - 12.70 K/uL 7.25   RBC 4.00 - 5.40 M/uL 4.22   Hemoglobin 12.0 - 16.0 g/dL 12.6   Hematocrit 37.0 - 48.5 % 39.4   MCV 82 - 98 fL 93   MCH 27.0 - 31.0 pg 29.9   MCHC 32.0 - 36.0 g/dL 32.0   RDW 11.5 - 14.5 % 13.7   Platelet Count 150 - 450 K/uL 317   MPV 9.2 - 12.9 fL 10.3   Gran % 38.0 - 73.0 %  56.5   Lymph % 18.0 - 48.0 % 28.8   Mono % 4.0 - 15.0 % 8.1   Eos % 0.0 - 8.0 % 5.0   Basophil % 0.0 - 1.9 % 1.5   Immature Granulocytes 0.0 - 0.5 % 0.1   Gran # (ANC) 1.8 - 7.7 K/uL 4.1   Lymph # 1.0 - 4.8 K/uL 2.1   Mono # 0.3 - 1.0 K/uL 0.6   Eos # 0.0 - 0.5 K/uL 0.4   Baso # 0.00 - 0.20 K/uL 0.11   Immature Grans (Abs) 0.00 - 0.04 K/uL 0.01   nRBC 0 /100 WBC 0   Differential Method  Automated   Sed Rate 0 - 29 mm/Hr 6   Sodium 136 - 145 mmol/L 136   Potassium 3.5 - 5.1 mmol/L 3.9   Chloride 95 - 110 mmol/L 100   CO2 22 - 31 mmol/L 30   Anion Gap 5 - 12 mmol/L 6   BUN 7 - 18 mg/dL 20 (H)   Creatinine 0.50 - 1.40 mg/dL 0.65   eGFR >60 mL/min/1.73 m^2 >60   Glucose 70 - 110 mg/dL 82   Calcium 8.4 - 10.2 mg/dL 9.6   ALP 38 - 145 U/L 80   PROTEIN TOTAL 6.0 - 8.4 g/dL 7.0   Albumin 3.5 - 5.2 g/dL 4.5   BILIRUBIN TOTAL 0.2 - 1.3 mg/dL 0.3   AST 14 - 36 U/L 25   ALT 0 - 35 U/L 24   CRP 0.00 - 0.90 mg/dL <0.50   Hep B S Ab IU/L <3.10   Hepatitis B Core Ab Total Negative  Negative   Hepatitis B Surface Ag  Negative   Hepatitis C Ab  Negative   (H): Data is abnormally high      Excellent improvement of plaque psoriasis and psoriatic arthritis on Tremfya.  Discontinued Celebrex without any flare of arthritis.  Continue Tremfya with all precautions as advised.  Drug induced immunodeficiency due to use of immunosuppressive drugs. Monitor carefully for infections. Advised to get immediate medical care if any infection. Also advised strict adherence to age appropriate vaccinations and cancer screenings with PCP.  Hold Tremfya if any infection  Safety labs every visit        # Follow up in about 6 months (around 5/22/2025).    Chronic comorbid conditions affecting medical decision making today:    Past Medical History:   Diagnosis Date    Anticoagulant long-term use     Aortic insufficiency     Bicuspid aortic valve     Carotid stenosis     COPD (chronic obstructive pulmonary disease)     Diabetes mellitus     Headache      Hyperlipidemia     Hypertension     Meniere's disease, unspecified ear     Palpitations     Psoriasis     Psoriatic Arthritis     Sleep apnea     uses cpap    SVT (supraventricular tachycardia)     Tobacco use        Past Surgical History:   Procedure Laterality Date    ANGIOGRAM, CORONARY, WITH LEFT HEART CATHETERIZATION N/A 2023    Procedure: Left Heart Cath;  Surgeon: Epi Bobo MD;  Location: [facility] CATH;  Service: Cardiology;  Laterality: N/A;    BREAST BIOPSY Right     excisional bx benign    BREAST SURGERY Right     hematoma from trauma    CARDIAC ELECTROPHYSIOLOGY STUDY AND ABLATION      COLONOSCOPY N/A 2016    Procedure: COLONOSCOPY;  Surgeon: Sushant Gutierrez MD;  Location[facility] ENDO;  Service: Endoscopy;  Laterality: N/A;    COLONOSCOPY N/A 2022    Procedure: COLONOSCOPY;  Surgeon: Bakari Mai Jr., MD;  Location: [facility] ENDO;  Service: Endoscopy;  Laterality: N/A;    HYSTERECTOMY      partial    KNEE ARTHROSCOPY Right         Social History     Tobacco Use    Smoking status: Former     Current packs/day: 0.00     Average packs/day: 0.8 packs/day for 46.8 years (35.1 ttl pk-yrs)     Types: Cigarettes     Start date: 1976     Quit date: 10/9/2022     Years since quittin.1    Smokeless tobacco: Never    Tobacco comments:     Pt has stopped & restarted multiple times   Substance Use Topics    Alcohol use: Yes     Comment: occasional    Drug use: No       Family History   Problem Relation Name Age of Onset    Cataracts Mother      Stroke Mother      Diabetes Mother      Hypertension Mother      Hyperlipidemia Mother      Macular degeneration Father      Cataracts Father      Heart disease Father      Coronary artery disease Father      Diabetes Father      Hypertension Father      Macular degeneration Paternal Uncle         Review of patient's allergies indicates:   Allergen Reactions    Lisinopril Other (See Comments)     uncontrollable cough    Penicillins Hives and  Swelling    Erythromycin Hives    Ibuprofen Itching and Rash     Aspirin OK    Tetracyclines Hives       Medication List with Changes/Refills   Current Medications    ASPIRIN 325 MG TABLET    Take 325 mg by mouth once daily.    BACLOFEN (LIORESAL) 10 MG TABLET    Take 1 tablet (10 mg total) by mouth 2 (two) times daily as needed (muscle spasms).    CELECOXIB (CELEBREX) 200 MG CAPSULE    Take 1 capsule (200 mg total) by mouth 2 (two) times daily.    CHLORTHALIDONE (HYGROTEN) 25 MG TAB    TAKE 1 TABLET(25 MG) BY MOUTH EVERY DAY    CLOBETASOL 0.05% (TEMOVATE) 0.05 % OINT    Apply topically 2 (two) times daily.    GUSELKUMAB (TREMFYA) 100 MG/ML ATIN    Inject 100 mg into the skin every 8 weeks.    LIDOCAINE (LIDODERM) 5 %    PLACE 1 PATCH ONTO SKIN ONCE DAILY, REMOVE AND DISCARD PATCH WITHIN 12 HOURS OR AS DIRECTED BY MD    LORAZEPAM (ATIVAN) 0.5 MG TABLET    Take 1 tablet (0.5 mg total) by mouth every 8 (eight) hours as needed for Anxiety.    LOSARTAN (COZAAR) 100 MG TABLET    TAKE 1 TABLET(100 MG) BY MOUTH EVERY DAY    METFORMIN (GLUCOPHAGE) 500 MG TABLET    Take 2 tablets (1,000 mg total) by mouth 2 (two) times daily with meals.    MUPIROCIN (BACTROBAN) 2 % OINTMENT    Apply topically 3 (three) times daily.    ROSUVASTATIN (CRESTOR) 5 MG TABLET    Take 1 tablet (5 mg total) by mouth once daily.    TACROLIMUS (PROTOPIC) 0.1 % OINTMENT    Apply topically 2 (two) times daily.    TRAZODONE (DESYREL) 50 MG TABLET    Take 1 tablet (50 mg total) by mouth every evening. TAKE 1 TABLET BY MOUTH TWO HOURS BEFORE BEDTIME    VERAPAMIL (VERELAN) 180 MG C24P    TAKE 1 CAPSULE(180 MG) BY MOUTH EVERY MORNING    VIT A/VIT C/VIT E/ZINC/COPPER (ICAPS AREDS ORAL)    Take by mouth once daily.         Disclaimer: This note was prepared using voice recognition system and is likely to have sound alike errors and is not proofread.  Please message me with any questions.    Visit today included increased complexity associated with the care of  the episodic problem -psoriatic arthritis ; addressed and managing the longitudinal care of the patient due to the serious and/or complex managed problem(s) plaque psoriasis, drug-induced immunodeficiency, medication monitoring encounter.

## 2024-11-22 ENCOUNTER — OFFICE VISIT (OUTPATIENT)
Dept: RHEUMATOLOGY | Facility: CLINIC | Age: 64
End: 2024-11-22
Payer: COMMERCIAL

## 2024-11-22 DIAGNOSIS — D84.821 DRUG-INDUCED IMMUNODEFICIENCY: ICD-10-CM

## 2024-11-22 DIAGNOSIS — L40.9 PSORIASIS: Primary | ICD-10-CM

## 2024-11-22 DIAGNOSIS — Z51.81 ENCOUNTER FOR MEDICATION MONITORING: ICD-10-CM

## 2024-11-22 DIAGNOSIS — Z79.899 DRUG-INDUCED IMMUNODEFICIENCY: ICD-10-CM

## 2024-11-22 DIAGNOSIS — L40.50 PSORIATIC ARTHRITIS: ICD-10-CM

## 2024-11-25 ENCOUNTER — PATIENT MESSAGE (OUTPATIENT)
Dept: RHEUMATOLOGY | Facility: CLINIC | Age: 64
End: 2024-11-25
Payer: COMMERCIAL

## 2024-12-02 ENCOUNTER — PROCEDURE VISIT (OUTPATIENT)
Dept: PAIN MEDICINE | Facility: CLINIC | Age: 64
End: 2024-12-02
Payer: COMMERCIAL

## 2024-12-02 VITALS
DIASTOLIC BLOOD PRESSURE: 61 MMHG | SYSTOLIC BLOOD PRESSURE: 131 MMHG | HEART RATE: 67 BPM | BODY MASS INDEX: 31.78 KG/M2 | WEIGHT: 215.25 LBS

## 2024-12-02 DIAGNOSIS — M79.18 MYOFASCIAL PAIN: Primary | ICD-10-CM

## 2024-12-02 PROCEDURE — 20552 NJX 1/MLT TRIGGER POINT 1/2: CPT | Mod: S$GLB,,, | Performed by: ANESTHESIOLOGY

## 2024-12-02 RX ORDER — METHYLPREDNISOLONE ACETATE 40 MG/ML
40 INJECTION, SUSPENSION INTRA-ARTICULAR; INTRALESIONAL; INTRAMUSCULAR; SOFT TISSUE
Status: DISCONTINUED | OUTPATIENT
Start: 2024-12-02 | End: 2024-12-02 | Stop reason: HOSPADM

## 2024-12-02 RX ORDER — METHYLPREDNISOLONE ACETATE 40 MG/ML
40 INJECTION, SUSPENSION INTRA-ARTICULAR; INTRALESIONAL; INTRAMUSCULAR; SOFT TISSUE
Status: COMPLETED | OUTPATIENT
Start: 2024-12-02 | End: 2024-12-02

## 2024-12-02 RX ADMIN — METHYLPREDNISOLONE ACETATE 40 MG: 40 INJECTION, SUSPENSION INTRA-ARTICULAR; INTRALESIONAL; INTRAMUSCULAR; SOFT TISSUE at 01:12

## 2024-12-02 NOTE — PROCEDURES
Trigger Point Injection    Performed by: Shaji Paredes MD  Authorized by: Shaji Paredes MD    Thoracic Paraspinal:  Right    Consent Done?:  Yes (Written)    Pre-Procedure:   Indications:  Pain relief    Local anesthesia used?: Yes    Local anesthetic:  Bupivacaine 0.25% without epinephrine and lidocaine 1% without epinephrine  Anesthetic total (ml):  8    Medications: 40 mg methylPREDNISolone acetate 40 mg/mL

## 2024-12-21 ENCOUNTER — TELEPHONE (OUTPATIENT)
Dept: PHARMACY | Facility: CLINIC | Age: 64
End: 2024-12-21
Payer: COMMERCIAL

## 2024-12-21 NOTE — TELEPHONE ENCOUNTER
[facility]/Population Health Chart Review & Patient Outreach Details For Medication Adherence Project    Reason for Outreach Encounter: 3rd Party payor non-compliance report ([employer], [employer], C, etc)  2.  Patient Outreach Method: Reviewed patient chart   3.   Medication in question:    Diabetes Medications               metFORMIN (GLUCOPHAGE) 500 MG tablet TAKE 2 TABLETS(1000 MG) BY MOUTH TWICE DAILY WITH MEALS                 Metformin  last filled  11/25/24 for 90 day supply      4.  Reviewed and or Updates Made To: Patient Chart  5. Outreach Outcomes and/or actions taken: Patient filled medication and is on track to be adherent  Additional Notes:

## 2025-01-23 PROBLEM — E11.69 TYPE 2 DIABETES MELLITUS WITH OTHER SPECIFIED COMPLICATION, WITHOUT LONG-TERM CURRENT USE OF INSULIN: Status: ACTIVE | Noted: 2025-01-23

## 2025-02-20 ENCOUNTER — TELEPHONE (OUTPATIENT)
Dept: PHARMACY | Facility: CLINIC | Age: 65
End: 2025-02-20
Payer: COMMERCIAL

## 2025-02-20 NOTE — TELEPHONE ENCOUNTER
[facility]/Population Health Chart Review & Patient Outreach Details For Medication Adherence Project    Reason for Outreach Encounter: 3rd Party payor non-compliance report ([employer], [employer], [employer], etc)  2.  Patient Outreach Method: Reviewed patient chart   3.   Medication in question:    Diabetes Medications              metFORMIN (GLUCOPHAGE) 500 MG tablet TAKE 2 TABLETS(1000 MG) BY MOUTH TWICE DAILY WITH MEALS                 metformin  last filled  11/25 for 90 day supply      4.  Reviewed and or Updates Made To: Patient Chart  5. Outreach Outcomes and/or actions taken: Patient filled medication and is on track to be adherent  Additional Notes:

## 2025-03-05 PROBLEM — S86.912A KNEE STRAIN, LEFT, INITIAL ENCOUNTER: Status: ACTIVE | Noted: 2025-03-05
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
## 2025-03-25 PROBLEM — R22.31 MASS OF RIGHT WRIST: Status: ACTIVE | Noted: 2025-03-25

## 2025-03-25 PROBLEM — M18.12 PRIMARY OSTEOARTHRITIS OF FIRST CARPOMETACARPAL JOINT OF LEFT HAND: Status: ACTIVE | Noted: 2025-03-25

## 2025-04-21 PROBLEM — E11.69 TYPE 2 DIABETES MELLITUS WITH OTHER SPECIFIED COMPLICATION, WITHOUT LONG-TERM CURRENT USE OF INSULIN: Status: RESOLVED | Noted: 2025-01-23 | Resolved: 2025-04-21

## 2025-05-28 ENCOUNTER — PATIENT MESSAGE (OUTPATIENT)
Dept: RHEUMATOLOGY | Facility: CLINIC | Age: 65
End: 2025-05-28

## 2025-05-28 ENCOUNTER — OFFICE VISIT (OUTPATIENT)
Dept: RHEUMATOLOGY | Facility: CLINIC | Age: 65
End: 2025-05-28
Payer: COMMERCIAL

## 2025-05-28 DIAGNOSIS — Z79.899 DRUG-INDUCED IMMUNODEFICIENCY: ICD-10-CM

## 2025-05-28 DIAGNOSIS — D84.821 DRUG-INDUCED IMMUNODEFICIENCY: ICD-10-CM

## 2025-05-28 DIAGNOSIS — L40.9 PSORIASIS: Primary | ICD-10-CM

## 2025-05-28 DIAGNOSIS — L40.50 PSORIATIC ARTHRITIS: ICD-10-CM

## 2025-05-28 DIAGNOSIS — Z51.81 ENCOUNTER FOR MEDICATION MONITORING: ICD-10-CM

## 2025-05-28 PROCEDURE — G2211 COMPLEX E/M VISIT ADD ON: HCPCS | Mod: 95,,, | Performed by: INTERNAL MEDICINE

## 2025-05-28 PROCEDURE — 98006 SYNCH AUDIO-VIDEO EST MOD 30: CPT | Mod: 95,,, | Performed by: INTERNAL MEDICINE

## 2025-05-28 PROCEDURE — 3044F HG A1C LEVEL LT 7.0%: CPT | Mod: CPTII,95,, | Performed by: INTERNAL MEDICINE

## 2025-05-28 PROCEDURE — 4010F ACE/ARB THERAPY RXD/TAKEN: CPT | Mod: CPTII,95,, | Performed by: INTERNAL MEDICINE

## 2025-05-28 PROCEDURE — 1159F MED LIST DOCD IN RCRD: CPT | Mod: CPTII,95,, | Performed by: INTERNAL MEDICINE

## 2025-05-28 PROCEDURE — 1160F RVW MEDS BY RX/DR IN RCRD: CPT | Mod: CPTII,95,, | Performed by: INTERNAL MEDICINE

## 2025-05-28 RX ORDER — GUSELKUMAB 100 MG/ML
100 INJECTION SUBCUTANEOUS
Qty: 1 ML | Refills: 3 | Status: ACTIVE | OUTPATIENT
Start: 2025-05-28

## 2025-05-28 NOTE — PROGRESS NOTES
The patient location is: [place]  The chief complaint leading to consultation is:  Follow-up for psoriasis and psoriatic arthritis    Visit type: audiovisual    Face to Face time with patient:  5 minutes  Each patient to whom he or she provides medical services by telemedicine is:  (1) informed of the relationship between the physician and patient and the respective role of any other health care provider with respect to management of the patient; and (2) notified that he or she may decline to receive medical services by telemedicine and may withdraw from such care at any time.    Notes:   Chief complaints, HPI, ROS, EXAM, Assessment & Plans:-    Lynn Turner is a 64 y.o. pleasant female who is seen today for follow-up visit.  She follows in the Rheumatology Clinic for psoriatic arthritis with severe plaque psoriasis and palmoplantar psoriasis.  Reports doing well today.  Well-controlled arthritis and plaque psoriasis on Tremfya.  Gets mild flare of plaque psoriasis in the ears which responds immediately to topical tacrolimus.  No adverse effects to medications.  Rheumatological review of system negative otherwise.  Physical examination shows complete resolution of rash over bilateral palms.  No dactylitis.  100% fist formation bilateral hands.      1. Psoriasis    2. Psoriatic arthritis    3. Encounter for medication monitoring    4. Drug-induced immunodeficiency        Problem List Items Addressed This Visit       Drug-induced immunodeficiency    Relevant Orders    CBC Auto Differential    Comprehensive Metabolic Panel    C-Reactive Protein    Sedimentation rate    Encounter for medication monitoring    Relevant Orders    CBC Auto Differential    Comprehensive Metabolic Panel    C-Reactive Protein    Sedimentation rate    Psoriasis - Primary    Relevant Medications    guselkumab (TREMFYA) 100 mg/mL AtIn    Other Relevant Orders    CBC Auto Differential    Comprehensive Metabolic Panel    C-Reactive Protein     Sedimentation rate    Psoriatic arthritis    Relevant Medications    guselkumab (TREMFYA) 100 mg/mL AtIn    Other Relevant Orders    CBC Auto Differential    Comprehensive Metabolic Panel    C-Reactive Protein    Sedimentation rate      Latest Reference Range & Units 05/21/25 08:28   WBC 3.90 - 12.70 K/uL 6.89   RBC 4.00 - 5.40 M/uL 4.41   Hemoglobin 12.0 - 16.0 g/dL 13.1   Hematocrit 37.0 - 48.5 % 40.8   MCV 82 - 98 fL 93   MCH 27.0 - 31.0 pg 29.7   MCHC 32.0 - 36.0 g/dL 32.1   RDW 11.5 - 14.5 % 13.5   Platelet Count 150 - 450 K/uL 308   MPV 9.2 - 12.9 fL 10.8   Gran % 38.0 - 73.0 % 60.9   Lymph % 18.0 - 48.0 % 24.8   Mono % 4.0 - 15.0 % 8.6   Eos % 0.0 - 8.0 % 3.9   Basophil % 0.0 - 1.9 % 1.5   Immature Granulocytes 0.0 - 0.5 % 0.3   Gran # (ANC) 1.8 - 7.7 K/uL 4.2   Lymph # 1.0 - 4.8 K/uL 1.7   Mono # 0.3 - 1.0 K/uL 0.6   Eos # 0.0 - 0.5 K/uL 0.3   Baso # 0.00 - 0.20 K/uL 0.10   Immature Grans (Abs) 0.00 - 0.04 K/uL 0.02   nRBC 0 /100 WBC 0   Differential Method  Automated   Sed Rate 0 - 29 mm/Hr 4   Sodium 136 - 145 mmol/L 139   Potassium 3.5 - 5.1 mmol/L 3.7   Chloride 95 - 110 mmol/L 101   CO2 23 - 29 mmol/L 28   Anion Gap 8 - 16 mmol/L 10   BUN 8 - 23 mg/dL 15   Creatinine 0.50 - 1.40 mg/dL 0.59   eGFR >60 mL/min/1.73 m^2 >60   Glucose 70 - 110 mg/dL 98   Calcium 8.7 - 10.5 mg/dL 9.5   ALP 40 - 150 U/L 76   PROTEIN TOTAL 6.0 - 8.4 g/dL 7.0   Albumin 3.5 - 5.2 g/dL 4.6   BILIRUBIN TOTAL 0.2 - 1.0 mg/dL 0.4   AST 10 - 40 U/L 19   ALT 10 - 44 U/L 19   CRP 0.0 - 8.2 mg/L 2.2         Well controlled plaque psoriasis and psoriatic arthritis on Tremfya.  Mild plaque psoriasis responds well to topical tacrolimus.  Continue Tremfya and p.r.n. topical tacrolimus with all precautions as advised.  Drug induced immunodeficiency due to use of immunosuppressive drugs. Monitor carefully for infections. Advised to get immediate medical care if any infection. Also advised strict adherence to age appropriate vaccinations and  cancer screenings with PCP.  Hold Tremfya if any infection  Safety labs every visit        # Follow up in about 1 year (around 2026).    Chronic comorbid conditions affecting medical decision making today:    Past Medical History:   Diagnosis Date    Anticoagulant long-term use     Aortic insufficiency     Bicuspid aortic valve     Carotid stenosis     COPD (chronic obstructive pulmonary disease)     Headache     Hyperlipidemia     Hypertension     Meniere's disease, unspecified ear     Palpitations     Psoriasis     Psoriatic Arthritis     Sleep apnea     uses cpap    SVT (supraventricular tachycardia)     Tobacco use        Past Surgical History:   Procedure Laterality Date    ANGIOGRAM, CORONARY, WITH LEFT HEART CATHETERIZATION N/A 2023    Procedure: Left Heart Cath;  Surgeon: Epi Bobo MD;  Location: [facility] CATH;  Service: Cardiology;  Laterality: N/A;    BREAST BIOPSY Right     excisional bx benign    BREAST SURGERY Right     hematoma from trauma    CARDIAC ELECTROPHYSIOLOGY STUDY AND ABLATION      COLONOSCOPY N/A 2016    Procedure: COLONOSCOPY;  Surgeon: Sushant Gutierrez MD;  Location: [facility] ENDO;  Service: Endoscopy;  Laterality: N/A;    COLONOSCOPY N/A 2022    Procedure: COLONOSCOPY;  Surgeon: Bakari Mai Jr., MD;  Location: [facility] ENDO;  Service: Endoscopy;  Laterality: N/A;    HYSTERECTOMY      partial    KNEE ARTHROSCOPY Right     TUBAL LIGATION          Social History     Tobacco Use    Smoking status: Former     Current packs/day: 0.00     Average packs/day: 0.8 packs/day for 46.8 years (35.1 ttl pk-yrs)     Types: Cigarettes     Start date: 1976     Quit date: 10/9/2022     Years since quittin.6    Smokeless tobacco: Never    Tobacco comments:     Pt has stopped & restarted multiple times   Substance Use Topics    Alcohol use: Yes     Alcohol/week: 3.0 standard drinks of alcohol     Types: 1 Glasses of wine, 2 Cans of beer per week     Comment: not  every week    Drug use: No       Family History   Problem Relation Name Age of Onset    Cataracts Mother Mom     Stroke Mother Mom     Diabetes Mother Mom     Hypertension Mother Mom     Hyperlipidemia Mother Mom     Macular degeneration Father Dad     Cataracts Father Dad     Heart disease Father Dad     Coronary artery disease Father Dad     Diabetes Father Dad     Hypertension Father Dad     Vision loss Father Dad     Macular degeneration Paternal Uncle      Early death Brother Esvin     Heart disease Brother Esvin     Heart disease Brother Scott        Review of patient's allergies indicates:   Allergen Reactions    Lisinopril Other (See Comments)     uncontrollable cough    Penicillins Hives and Swelling    Erythromycin Hives    Ibuprofen Itching and Rash     Aspirin OK    Tetracyclines Hives       Medication List with Changes/Refills   Current Medications    ASPIRIN 325 MG TABLET    Take 325 mg by mouth once daily.    CHLORTHALIDONE (HYGROTEN) 25 MG TAB    TAKE 1 TABLET(25 MG) BY MOUTH EVERY DAY    LIDOCAINE (LIDODERM) 5 %    PLACE 1 PATCH ONTO SKIN ONCE DAILY, REMOVE AND DISCARD PATCH WITHIN 12 HOURS OR AS DIRECTED BY MD    LORAZEPAM (ATIVAN) 0.5 MG TABLET    Take 1 tablet (0.5 mg total) by mouth every 8 (eight) hours as needed for Anxiety.    LOSARTAN (COZAAR) 100 MG TABLET    TAKE 1 TABLET(100 MG) BY MOUTH EVERY DAY    METFORMIN (GLUCOPHAGE) 500 MG TABLET    TAKE 2 TABLETS(1000 MG) BY MOUTH TWICE DAILY WITH MEALS    ROSUVASTATIN (CRESTOR) 5 MG TABLET    Take 1 tablet (5 mg total) by mouth once daily.    TACROLIMUS (PROTOPIC) 0.1 % OINTMENT    Apply topically 2 (two) times daily.    TRAZODONE (DESYREL) 50 MG TABLET    TAKE 1 TABLET(50 MG) BY MOUTH EVERY NIGHT 2 HOURS BEFORE BEDTIME    VERAPAMIL (VERELAN) 180 MG C24P    TAKE 1 CAPSULE(180 MG) BY MOUTH EVERY MORNING    VIT A/VIT C/VIT E/ZINC/COPPER (ICAPS AREDS ORAL)    Take by mouth once daily.   Changed and/or Refilled Medications    Modified Medication  Previous Medication    GUSELKUMAB (TREMFYA) 100 MG/ML ATIN guselkumab (TREMFYA) 100 mg/mL AtIn       Inject 100 mg into the skin every 8 weeks.    Inject 100 mg into the skin every 8 weeks.         Disclaimer: This note was prepared using voice recognition system and is likely to have sound alike errors and is not proofread.  Please message me with any questions.    Visit today included increased complexity associated with the care of the episodic problem -psoriatic arthritis ; addressed and managing the longitudinal care of the patient due to the serious and/or complex managed problem(s) plaque psoriasis, drug-induced immunodeficiency, medication monitoring encounter.